# Patient Record
Sex: FEMALE | Race: ASIAN | Employment: FULL TIME | URBAN - METROPOLITAN AREA
[De-identification: names, ages, dates, MRNs, and addresses within clinical notes are randomized per-mention and may not be internally consistent; named-entity substitution may affect disease eponyms.]

---

## 2017-01-11 ENCOUNTER — COMMUNICATION - HEALTHEAST (OUTPATIENT)
Dept: ENDOCRINOLOGY | Facility: CLINIC | Age: 42
End: 2017-01-11

## 2017-01-11 DIAGNOSIS — E11.9 DIABETES TYPE 2, CONTROLLED (H): ICD-10-CM

## 2017-01-20 ENCOUNTER — COMMUNICATION - HEALTHEAST (OUTPATIENT)
Dept: OTOLARYNGOLOGY | Facility: CLINIC | Age: 42
End: 2017-01-20

## 2017-01-20 ENCOUNTER — COMMUNICATION - HEALTHEAST (OUTPATIENT)
Dept: ENDOCRINOLOGY | Facility: CLINIC | Age: 42
End: 2017-01-20

## 2017-01-20 DIAGNOSIS — E11.9 DIABETES TYPE 2, CONTROLLED (H): ICD-10-CM

## 2017-01-23 ENCOUNTER — COMMUNICATION - HEALTHEAST (OUTPATIENT)
Dept: OTOLARYNGOLOGY | Facility: CLINIC | Age: 42
End: 2017-01-23

## 2017-01-23 DIAGNOSIS — E11.9 DIABETES TYPE 2, CONTROLLED (H): ICD-10-CM

## 2017-03-16 ENCOUNTER — COMMUNICATION - HEALTHEAST (OUTPATIENT)
Dept: ENDOCRINOLOGY | Facility: CLINIC | Age: 42
End: 2017-03-16

## 2017-03-23 ENCOUNTER — AMBULATORY - HEALTHEAST (OUTPATIENT)
Dept: LAB | Facility: CLINIC | Age: 42
End: 2017-03-23

## 2017-03-23 DIAGNOSIS — E11.9 DIABETES TYPE 2, CONTROLLED (H): ICD-10-CM

## 2017-03-23 LAB
ALT SERPL W P-5'-P-CCNC: 18 U/L (ref 0–45)
CHOLEST SERPL-MCNC: 222 MG/DL
CREAT SERPL-MCNC: 0.76 MG/DL (ref 0.6–1.1)
FASTING STATUS PATIENT QL REPORTED: YES
GFR SERPL CREATININE-BSD FRML MDRD: >60 ML/MIN/1.73M2
HBA1C MFR BLD: 6.7 % (ref 3.5–6)
HDLC SERPL-MCNC: 54 MG/DL
LDLC SERPL CALC-MCNC: 141 MG/DL
TRIGL SERPL-MCNC: 137 MG/DL

## 2017-03-24 ENCOUNTER — OFFICE VISIT - HEALTHEAST (OUTPATIENT)
Dept: ENDOCRINOLOGY | Facility: CLINIC | Age: 42
End: 2017-03-24

## 2017-03-24 DIAGNOSIS — E11.9 DIABETES TYPE 2, CONTROLLED (H): ICD-10-CM

## 2017-03-25 ENCOUNTER — COMMUNICATION - HEALTHEAST (OUTPATIENT)
Dept: ENDOCRINOLOGY | Facility: CLINIC | Age: 42
End: 2017-03-25

## 2017-03-25 DIAGNOSIS — E11.9 DIABETES TYPE 2, CONTROLLED (H): ICD-10-CM

## 2017-04-19 ENCOUNTER — COMMUNICATION - HEALTHEAST (OUTPATIENT)
Dept: OTOLARYNGOLOGY | Facility: CLINIC | Age: 42
End: 2017-04-19

## 2017-04-19 DIAGNOSIS — E11.9 DIABETES TYPE 2, CONTROLLED (H): ICD-10-CM

## 2017-05-24 ENCOUNTER — COMMUNICATION - HEALTHEAST (OUTPATIENT)
Dept: ENDOCRINOLOGY | Facility: CLINIC | Age: 42
End: 2017-05-24

## 2017-05-24 DIAGNOSIS — E11.9 DIABETES TYPE 2, CONTROLLED (H): ICD-10-CM

## 2017-07-23 ENCOUNTER — COMMUNICATION - HEALTHEAST (OUTPATIENT)
Dept: ENDOCRINOLOGY | Facility: CLINIC | Age: 42
End: 2017-07-23

## 2017-07-23 DIAGNOSIS — E11.9 DIABETES TYPE 2, CONTROLLED (H): ICD-10-CM

## 2017-07-28 ENCOUNTER — COMMUNICATION - HEALTHEAST (OUTPATIENT)
Dept: ENDOCRINOLOGY | Facility: CLINIC | Age: 42
End: 2017-07-28

## 2017-07-28 DIAGNOSIS — E11.9 DIABETES TYPE 2, CONTROLLED (H): ICD-10-CM

## 2017-10-27 ENCOUNTER — COMMUNICATION - HEALTHEAST (OUTPATIENT)
Dept: ENDOCRINOLOGY | Facility: CLINIC | Age: 42
End: 2017-10-27

## 2017-10-27 DIAGNOSIS — E11.9 DIABETES TYPE 2, CONTROLLED (H): ICD-10-CM

## 2017-10-30 ENCOUNTER — COMMUNICATION - HEALTHEAST (OUTPATIENT)
Dept: ADMINISTRATIVE | Facility: CLINIC | Age: 42
End: 2017-10-30

## 2017-10-30 DIAGNOSIS — E11.9 DIABETES TYPE 2, CONTROLLED (H): ICD-10-CM

## 2017-10-30 DIAGNOSIS — E55.9 VITAMIN D DEFICIENCY: ICD-10-CM

## 2017-11-07 ENCOUNTER — OFFICE VISIT - HEALTHEAST (OUTPATIENT)
Dept: ENDOCRINOLOGY | Facility: CLINIC | Age: 42
End: 2017-11-07

## 2017-11-07 DIAGNOSIS — E55.9 VITAMIN D DEFICIENCY: ICD-10-CM

## 2017-11-07 DIAGNOSIS — E11.9 DIABETES TYPE 2, CONTROLLED (H): ICD-10-CM

## 2017-11-07 LAB
CREAT SERPL-MCNC: 0.63 MG/DL (ref 0.6–1.1)
GFR SERPL CREATININE-BSD FRML MDRD: >60 ML/MIN/1.73M2
HBA1C MFR BLD: 5.9 % (ref 3.5–6)

## 2017-11-07 ASSESSMENT — MIFFLIN-ST. JEOR: SCORE: 1360.5

## 2017-11-14 ENCOUNTER — COMMUNICATION - HEALTHEAST (OUTPATIENT)
Dept: ENDOCRINOLOGY | Facility: CLINIC | Age: 42
End: 2017-11-14

## 2017-11-15 ENCOUNTER — COMMUNICATION - HEALTHEAST (OUTPATIENT)
Dept: ENDOCRINOLOGY | Facility: CLINIC | Age: 42
End: 2017-11-15

## 2017-11-15 DIAGNOSIS — E11.9 DIABETES (H): ICD-10-CM

## 2017-11-21 ENCOUNTER — COMMUNICATION - HEALTHEAST (OUTPATIENT)
Dept: ENDOCRINOLOGY | Facility: CLINIC | Age: 42
End: 2017-11-21

## 2017-11-21 DIAGNOSIS — E11.9 DIABETES TYPE 2, CONTROLLED (H): ICD-10-CM

## 2017-11-24 ENCOUNTER — COMMUNICATION - HEALTHEAST (OUTPATIENT)
Dept: ENDOCRINOLOGY | Facility: CLINIC | Age: 42
End: 2017-11-24

## 2017-11-24 DIAGNOSIS — E11.9 DIABETES TYPE 2, CONTROLLED (H): ICD-10-CM

## 2018-01-19 ENCOUNTER — COMMUNICATION - HEALTHEAST (OUTPATIENT)
Dept: ENDOCRINOLOGY | Facility: CLINIC | Age: 43
End: 2018-01-19

## 2018-01-19 DIAGNOSIS — E11.9 DIABETES TYPE 2, CONTROLLED (H): ICD-10-CM

## 2018-01-25 ENCOUNTER — RECORDS - HEALTHEAST (OUTPATIENT)
Dept: LAB | Facility: CLINIC | Age: 43
End: 2018-01-25

## 2018-01-27 LAB — BACTERIA SPEC CULT: ABNORMAL

## 2018-03-03 ENCOUNTER — RECORDS - HEALTHEAST (OUTPATIENT)
Dept: LAB | Facility: CLINIC | Age: 43
End: 2018-03-03

## 2018-03-05 LAB — BACTERIA SPEC CULT: ABNORMAL

## 2018-04-17 ENCOUNTER — AMBULATORY - HEALTHEAST (OUTPATIENT)
Dept: ENDOCRINOLOGY | Facility: CLINIC | Age: 43
End: 2018-04-17

## 2018-04-17 DIAGNOSIS — E11.9 DIABETES TYPE 2, CONTROLLED (H): ICD-10-CM

## 2018-04-30 ENCOUNTER — AMBULATORY - HEALTHEAST (OUTPATIENT)
Dept: LAB | Facility: CLINIC | Age: 43
End: 2018-04-30

## 2018-04-30 DIAGNOSIS — E11.9 DIABETES TYPE 2, CONTROLLED (H): ICD-10-CM

## 2018-04-30 LAB
CHOLEST SERPL-MCNC: 210 MG/DL
CREAT SERPL-MCNC: 0.68 MG/DL (ref 0.6–1.1)
CREAT UR-MCNC: 36.9 MG/DL
FASTING STATUS PATIENT QL REPORTED: YES
GFR SERPL CREATININE-BSD FRML MDRD: >60 ML/MIN/1.73M2
HBA1C MFR BLD: 5.8 % (ref 3.5–6)
HDLC SERPL-MCNC: 59 MG/DL
LDLC SERPL CALC-MCNC: 127 MG/DL
MICROALBUMIN UR-MCNC: <0.5 MG/DL (ref 0–1.99)
MICROALBUMIN/CREAT UR: NORMAL MG/G
POTASSIUM BLD-SCNC: 4.4 MMOL/L (ref 3.5–5)
TRIGL SERPL-MCNC: 121 MG/DL

## 2018-05-07 ENCOUNTER — OFFICE VISIT - HEALTHEAST (OUTPATIENT)
Dept: ENDOCRINOLOGY | Facility: CLINIC | Age: 43
End: 2018-05-07

## 2018-05-07 DIAGNOSIS — E11.9 DIABETES TYPE 2, CONTROLLED (H): ICD-10-CM

## 2018-05-07 DIAGNOSIS — E55.9 VITAMIN D DEFICIENCY: ICD-10-CM

## 2018-05-07 ASSESSMENT — MIFFLIN-ST. JEOR: SCORE: 1327.38

## 2018-05-22 ENCOUNTER — COMMUNICATION - HEALTHEAST (OUTPATIENT)
Dept: ENDOCRINOLOGY | Facility: CLINIC | Age: 43
End: 2018-05-22

## 2018-05-22 DIAGNOSIS — E11.9 DIABETES TYPE 2, CONTROLLED (H): ICD-10-CM

## 2018-07-21 ENCOUNTER — COMMUNICATION - HEALTHEAST (OUTPATIENT)
Dept: ENDOCRINOLOGY | Facility: CLINIC | Age: 43
End: 2018-07-21

## 2018-07-21 DIAGNOSIS — E11.9 DIABETES TYPE 2, CONTROLLED (H): ICD-10-CM

## 2018-10-12 ENCOUNTER — AMBULATORY - HEALTHEAST (OUTPATIENT)
Dept: ENDOCRINOLOGY | Facility: CLINIC | Age: 43
End: 2018-10-12

## 2018-10-12 DIAGNOSIS — E11.9 DIABETES TYPE 2, CONTROLLED (H): ICD-10-CM

## 2018-11-01 ENCOUNTER — AMBULATORY - HEALTHEAST (OUTPATIENT)
Dept: LAB | Facility: CLINIC | Age: 43
End: 2018-11-01

## 2018-11-01 DIAGNOSIS — E11.9 DIABETES TYPE 2, CONTROLLED (H): ICD-10-CM

## 2018-11-01 LAB
CHOLEST SERPL-MCNC: 221 MG/DL
CREAT SERPL-MCNC: 0.75 MG/DL (ref 0.6–1.1)
FASTING STATUS PATIENT QL REPORTED: YES
GFR SERPL CREATININE-BSD FRML MDRD: >60 ML/MIN/1.73M2
HBA1C MFR BLD: 6.8 % (ref 3.5–6)
HDLC SERPL-MCNC: 74 MG/DL
LDLC SERPL CALC-MCNC: 124 MG/DL
POTASSIUM BLD-SCNC: 4.5 MMOL/L (ref 3.5–5)
TRIGL SERPL-MCNC: 115 MG/DL

## 2018-11-03 ENCOUNTER — COMMUNICATION - HEALTHEAST (OUTPATIENT)
Dept: ENDOCRINOLOGY | Facility: CLINIC | Age: 43
End: 2018-11-03

## 2018-11-03 DIAGNOSIS — E11.9 DIABETES TYPE 2, CONTROLLED (H): ICD-10-CM

## 2018-11-08 ENCOUNTER — OFFICE VISIT - HEALTHEAST (OUTPATIENT)
Dept: ENDOCRINOLOGY | Facility: CLINIC | Age: 43
End: 2018-11-08

## 2018-11-08 ASSESSMENT — MIFFLIN-ST. JEOR: SCORE: 1355.05

## 2018-11-12 ENCOUNTER — COMMUNICATION - HEALTHEAST (OUTPATIENT)
Dept: ENDOCRINOLOGY | Facility: CLINIC | Age: 43
End: 2018-11-12

## 2018-11-12 DIAGNOSIS — E11.9 DIABETES TYPE 2, CONTROLLED (H): ICD-10-CM

## 2018-11-13 ENCOUNTER — COMMUNICATION - HEALTHEAST (OUTPATIENT)
Dept: ENDOCRINOLOGY | Facility: CLINIC | Age: 43
End: 2018-11-13

## 2018-11-13 DIAGNOSIS — E11.9 DIABETES TYPE 2, CONTROLLED (H): ICD-10-CM

## 2018-11-22 ENCOUNTER — COMMUNICATION - HEALTHEAST (OUTPATIENT)
Dept: ENDOCRINOLOGY | Facility: CLINIC | Age: 43
End: 2018-11-22

## 2018-11-22 DIAGNOSIS — E11.9 DIABETES TYPE 2, CONTROLLED (H): ICD-10-CM

## 2018-11-30 ENCOUNTER — COMMUNICATION - HEALTHEAST (OUTPATIENT)
Dept: ENDOCRINOLOGY | Facility: CLINIC | Age: 43
End: 2018-11-30

## 2018-11-30 DIAGNOSIS — E11.9 DIABETES TYPE 2, CONTROLLED (H): ICD-10-CM

## 2018-12-01 ENCOUNTER — COMMUNICATION - HEALTHEAST (OUTPATIENT)
Dept: ENDOCRINOLOGY | Facility: CLINIC | Age: 43
End: 2018-12-01

## 2019-01-14 ENCOUNTER — COMMUNICATION - HEALTHEAST (OUTPATIENT)
Dept: ENDOCRINOLOGY | Facility: CLINIC | Age: 44
End: 2019-01-14

## 2019-01-14 DIAGNOSIS — E11.9 DIABETES TYPE 2, CONTROLLED (H): ICD-10-CM

## 2019-02-20 ENCOUNTER — NURSE TRIAGE (OUTPATIENT)
Dept: NURSING | Facility: CLINIC | Age: 44
End: 2019-02-20

## 2019-02-21 ENCOUNTER — COMMUNICATION - HEALTHEAST (OUTPATIENT)
Dept: ADMINISTRATIVE | Facility: CLINIC | Age: 44
End: 2019-02-21

## 2019-02-21 DIAGNOSIS — E11.9 DIABETES TYPE 2, CONTROLLED (H): ICD-10-CM

## 2019-04-17 ENCOUNTER — AMBULATORY - HEALTHEAST (OUTPATIENT)
Dept: ENDOCRINOLOGY | Facility: CLINIC | Age: 44
End: 2019-04-17

## 2019-04-17 DIAGNOSIS — E11.42 CONTROLLED TYPE 2 DIABETES MELLITUS WITH DIABETIC POLYNEUROPATHY, WITH LONG-TERM CURRENT USE OF INSULIN (H): ICD-10-CM

## 2019-04-17 DIAGNOSIS — Z79.4 CONTROLLED TYPE 2 DIABETES MELLITUS WITH DIABETIC POLYNEUROPATHY, WITH LONG-TERM CURRENT USE OF INSULIN (H): ICD-10-CM

## 2019-06-11 ENCOUNTER — RECORDS - HEALTHEAST (OUTPATIENT)
Dept: LAB | Facility: CLINIC | Age: 44
End: 2019-06-11

## 2019-06-11 LAB
ANION GAP SERPL CALCULATED.3IONS-SCNC: 11 MMOL/L (ref 5–18)
BUN SERPL-MCNC: 13 MG/DL (ref 8–22)
CALCIUM SERPL-MCNC: 9.8 MG/DL (ref 8.5–10.5)
CHLORIDE BLD-SCNC: 103 MMOL/L (ref 98–107)
CHOLEST SERPL-MCNC: 217 MG/DL
CO2 SERPL-SCNC: 27 MMOL/L (ref 22–31)
CREAT SERPL-MCNC: 0.76 MG/DL (ref 0.6–1.1)
FASTING STATUS PATIENT QL REPORTED: ABNORMAL
GFR SERPL CREATININE-BSD FRML MDRD: >60 ML/MIN/1.73M2
GLUCOSE BLD-MCNC: 163 MG/DL (ref 70–125)
HDLC SERPL-MCNC: 70 MG/DL
LDLC SERPL CALC-MCNC: 121 MG/DL
POTASSIUM BLD-SCNC: 3.8 MMOL/L (ref 3.5–5)
SODIUM SERPL-SCNC: 141 MMOL/L (ref 136–145)
TRIGL SERPL-MCNC: 129 MG/DL

## 2019-10-24 ENCOUNTER — OFFICE VISIT (OUTPATIENT)
Dept: PHARMACY | Facility: PHYSICIAN GROUP | Age: 44
End: 2019-10-24
Payer: COMMERCIAL

## 2019-10-24 ENCOUNTER — TRANSFERRED RECORDS (OUTPATIENT)
Dept: HEALTH INFORMATION MANAGEMENT | Facility: CLINIC | Age: 44
End: 2019-10-24

## 2019-10-24 DIAGNOSIS — E11.9 TYPE 2 DIABETES MELLITUS WITHOUT COMPLICATION, WITH LONG-TERM CURRENT USE OF INSULIN (H): Primary | ICD-10-CM

## 2019-10-24 DIAGNOSIS — F41.9 ANXIETY: ICD-10-CM

## 2019-10-24 DIAGNOSIS — E78.5 HYPERLIPIDEMIA LDL GOAL <100: ICD-10-CM

## 2019-10-24 DIAGNOSIS — Z79.4 TYPE 2 DIABETES MELLITUS WITHOUT COMPLICATION, WITH LONG-TERM CURRENT USE OF INSULIN (H): Primary | ICD-10-CM

## 2019-10-24 DIAGNOSIS — F32.9 MAJOR DEPRESSIVE DISORDER, REMISSION STATUS UNSPECIFIED, UNSPECIFIED WHETHER RECURRENT: ICD-10-CM

## 2019-10-24 LAB — HBA1C MFR BLD: 10.5 % (ref 4.2–6.1)

## 2019-10-24 PROCEDURE — 99607 MTMS BY PHARM ADDL 15 MIN: CPT | Performed by: PHARMACIST

## 2019-10-24 PROCEDURE — 99605 MTMS BY PHARM NP 15 MIN: CPT | Performed by: PHARMACIST

## 2019-10-24 RX ORDER — INSULIN LISPRO 100 [IU]/ML
INJECTION, SUSPENSION SUBCUTANEOUS
COMMUNITY
Start: 2019-02-21

## 2019-10-24 RX ORDER — ATORVASTATIN CALCIUM 80 MG/1
80 TABLET, FILM COATED ORAL DAILY
COMMUNITY

## 2019-10-24 RX ORDER — DULOXETIN HYDROCHLORIDE 60 MG/1
120 CAPSULE, DELAYED RELEASE ORAL DAILY
COMMUNITY
Start: 2015-02-24

## 2019-10-24 RX ORDER — HYDROXYZINE PAMOATE 50 MG/1
50 CAPSULE ORAL 4 TIMES DAILY PRN
COMMUNITY
Start: 2015-02-24

## 2019-10-24 ASSESSMENT — ANXIETY QUESTIONNAIRES
3. WORRYING TOO MUCH ABOUT DIFFERENT THINGS: MORE THAN HALF THE DAYS
7. FEELING AFRAID AS IF SOMETHING AWFUL MIGHT HAPPEN: MORE THAN HALF THE DAYS
5. BEING SO RESTLESS THAT IT IS HARD TO SIT STILL: MORE THAN HALF THE DAYS
2. NOT BEING ABLE TO STOP OR CONTROL WORRYING: MORE THAN HALF THE DAYS
IF YOU CHECKED OFF ANY PROBLEMS ON THIS QUESTIONNAIRE, HOW DIFFICULT HAVE THESE PROBLEMS MADE IT FOR YOU TO DO YOUR WORK, TAKE CARE OF THINGS AT HOME, OR GET ALONG WITH OTHER PEOPLE: SOMEWHAT DIFFICULT
GAD7 TOTAL SCORE: 14
6. BECOMING EASILY ANNOYED OR IRRITABLE: MORE THAN HALF THE DAYS
1. FEELING NERVOUS, ANXIOUS, OR ON EDGE: MORE THAN HALF THE DAYS

## 2019-10-24 ASSESSMENT — PATIENT HEALTH QUESTIONNAIRE - PHQ9: 5. POOR APPETITE OR OVEREATING: MORE THAN HALF THE DAYS

## 2019-10-24 ASSESSMENT — MIFFLIN-ST. JEOR: SCORE: 1420.96

## 2019-10-24 NOTE — PROGRESS NOTES
SUBJECTIVE/OBJECTIVE:                           Evangelista Watkins is a 44 year old female coming in for an initial visit for Medication Therapy Management.  She was referred to me from Anju Davalos.    Chief Complaint: Medication review.  Help with diabetes.      Allergies/ADRs: Reviewed in ECW  Tobacco: Quit 3-6 months ago  Alcohol: None currently  Caffeine: 4 cups/day of coffee  Activity: works out at gym 2-3 times per week  PMH: Reviewed in ECW    Medication Adherence/Access:  no issues reported  Barriers:  Costs of medications, has a $2000 deductible.     Diabetes:    Pt currently taking Humalog mix 75/25, 20 units in the morning and 10 units in the evening. Pt is not experiencing side effects.  Pt had been prescribed Victoza but didn't start yet because it was too expensive.  She would prefer the once weekly injection as well.  Discussed other medication options and wants to avoid meds that could cause weight gain and SGLT2 inhibitors due to risk for UTIs.    Hx of [employer] in [place], and had severe diarrhea at higher doses of metformin.  Unkown if she tried the ER formulation.  SMBG: doesn't test at home  Patient is not experiencing hypoglycemia  Recent symptoms of high blood sugar? none  Eye exam: Not discussed   Foot exam: Not discussed  ACEi/ARB: No.   Aspirin: Not taking, didn't discuss today.  Pt was distraught when I met with her, so we just discussed what she wanted to.  Pt was visibly upset and crying.    Diet/Exercise: Didn't discuss in detail.    GLYCOSYLATED HGB A1C - 10/24/2019      NAME VALUE REFERENCE RANGE LAB   F HGB A1C 10.5 H         GLYCOSYLATED HGB A1C - 06/11/2019      NAME VALUE REFERENCE RANGE LAB   F HGB A1C 9.3 H         Hyperlipidemia:   Current therapy includes Atorvastatin 80mg once daily.  Pt reports no significant myalgias or other side effects.      LIPID CASCADE - 06/11/2019      NAME VALUE REFERENCE RANGE    CHOLESTEROL 217 H <=199 (mg/dL)    TRIGLYCERIDES 129 <=149  "(mg/dL)    HDL CHOLESTEROL 70 >=50 (mg/dL)    LDL CHOLESTEROL CALCULATED 121 <=129 (mg/dL)     Depression/Anxiety:    Current medications include: Duloxetine 120mg once daily, and Hydroxyzine 50mg four times daily PRN (anxiety). Pt reports that depression symptoms are worsened. Has a lot of stressful things going on in life right now.  Didn't discuss in detail today with patient because she and Anju just discussed in their apt.  Pt was visibly upset and crying during our apt.     PHQ-9 SCORE 10/24/2019   PHQ-9 Total Score 14   GAD7 score: 14      BASIC METABOLIC PROFILE - 06/11/2019      NAME VALUE REFERENCE RANGE    SODIUM 141 136-145 (mmol/L)    POTASSIUM 3.8 3.5-5.0 (mmol/L)    CHLORIDE 103  (mmol/L)    CO2 27 22-31 (mmol/L)    ANION GAP, CALCULATION 11 5-18 (mmol/L)    GLUCOSE 163 H  (mg/dL)    CALCIUM 9.8 8.5-10.5 (mg/dL)    BLOOD UREA NITROGEN 13 8-22 (mg/dL)    CREATININE 0.76 0.60-1.10 (mg/dL)    GFR MDRD AF AMER >60 >60 (mL/min/1.73m2)    GFR MDRD NON AF AMER >60 >60 (mL/min/1.73m2)         Today's Vitals: /84   Pulse 100   Ht 5' 3.25\" (1.607 m)   Wt 177 lb (80.3 kg)   BMI 31.11 kg/m        ASSESSMENT:                              Medication Adherence: good, no issues identified      Diabetes:  Needs Improvement. Patient is not meeting A1c goal of < 7%.  Aspirin started at recent apt, indicated for patient over age 50.      Hyperlipidemia:   Stable. Pt is on high intensity statin which is indicated based on 2019 ACC/AHA guidelines for lipid management.        Depression/Anxiety:    Needs Improvement. Not fully discussed at today's apt, addressed by PCP.        PLAN:                            1)  Discussed plan for starting Ozempic today in clinic, Verbal OK given from Anju and pt given sample of Ozempic.        UPDATE 10/29:  Pt called - She had started Ozempic last thursday.  Had a bout of explosive diarrhea today at work and had to go home sick.  Denies nausea, fever/chills, " myalgias.  Appetite is lower now, and no more cravings for sweets.  No hypoglycemia.  Pt could use imodium PRN for diarrhea.  Will follow up with her on 10/31 about her symptoms to see how she is feeling.  If the diarrhea continues or worsens, would could try alternative medication (GLP1).    She reports that most of them are equally expensive but would be willing to pay the deductible if they work.  Other options:  continue insulin titration, re-trial on metformin ER, pioglitazone, or SGLT2 inhibitor    I spent 60 minutes with this patient today. I offer these suggestions for consideration by Anju Miller. A copy of the visit note was provided to the patient's primary care provider.    Will follow up in 1 week.    The patient declined a summary of these recommendations as an after visit summary.     Jelena Wallace, PharmD  Medication Therapy Management Pharmacist  Pager: [phone]

## 2019-10-29 VITALS
SYSTOLIC BLOOD PRESSURE: 118 MMHG | HEART RATE: 100 BPM | BODY MASS INDEX: 31.36 KG/M2 | HEIGHT: 63 IN | DIASTOLIC BLOOD PRESSURE: 84 MMHG | WEIGHT: 177 LBS

## 2019-10-29 ASSESSMENT — PATIENT HEALTH QUESTIONNAIRE - PHQ9: SUM OF ALL RESPONSES TO PHQ QUESTIONS 1-9: 14

## 2019-10-30 ASSESSMENT — ANXIETY QUESTIONNAIRES: GAD7 TOTAL SCORE: 14

## 2019-11-01 ENCOUNTER — RECORDS - HEALTHEAST (OUTPATIENT)
Dept: LAB | Facility: CLINIC | Age: 44
End: 2019-11-01

## 2019-11-03 LAB
BACTERIA SPEC CULT: ABNORMAL
BACTERIA SPEC CULT: ABNORMAL

## 2020-02-18 ENCOUNTER — RECORDS - HEALTHEAST (OUTPATIENT)
Dept: LAB | Facility: CLINIC | Age: 45
End: 2020-02-18

## 2020-02-21 LAB — BACTERIA SPEC CULT: NORMAL

## 2020-04-21 ENCOUNTER — TRANSFERRED RECORDS (OUTPATIENT)
Dept: HEALTH INFORMATION MANAGEMENT | Facility: CLINIC | Age: 45
End: 2020-04-21

## 2020-04-21 LAB — HBA1C MFR BLD: 10.1 % (ref 4.2–6.1)

## 2020-05-08 ENCOUNTER — ALLIED HEALTH/NURSE VISIT (OUTPATIENT)
Dept: PHARMACY | Facility: PHYSICIAN GROUP | Age: 45
End: 2020-05-08
Payer: COMMERCIAL

## 2020-05-08 DIAGNOSIS — F41.9 ANXIETY: ICD-10-CM

## 2020-05-08 DIAGNOSIS — Z79.4 TYPE 2 DIABETES MELLITUS WITHOUT COMPLICATION, WITH LONG-TERM CURRENT USE OF INSULIN (H): Primary | ICD-10-CM

## 2020-05-08 DIAGNOSIS — F32.9 MAJOR DEPRESSIVE DISORDER, REMISSION STATUS UNSPECIFIED, UNSPECIFIED WHETHER RECURRENT: ICD-10-CM

## 2020-05-08 DIAGNOSIS — E11.9 TYPE 2 DIABETES MELLITUS WITHOUT COMPLICATION, WITH LONG-TERM CURRENT USE OF INSULIN (H): Primary | ICD-10-CM

## 2020-05-08 DIAGNOSIS — F19.982 DRUG-INDUCED INSOMNIA (H): ICD-10-CM

## 2020-05-08 DIAGNOSIS — E78.5 HYPERLIPIDEMIA LDL GOAL <100: ICD-10-CM

## 2020-05-08 PROCEDURE — 99607 MTMS BY PHARM ADDL 15 MIN: CPT | Performed by: PHARMACIST

## 2020-05-08 PROCEDURE — 99605 MTMS BY PHARM NP 15 MIN: CPT | Performed by: PHARMACIST

## 2020-05-08 NOTE — PROGRESS NOTES
MTM ENCOUNTER  SUBJECTIVE/OBJECTIVE:                           Evangelista Watkins is a 45 year old female called for a follow-up visit. She was referred to me from Anju Joe.  Today's visit is a follow-up MTM visit from 10/42/2019.     Patient consented to a telehealth visit: yes  Telemedicine Visit Details  Type of service:  Telephone visit  Start Time: 1:30 pm  End Time: 2:04 PM  Originating Location (pt. Location): Home  Distant Location (provider location):  [facility] [place]  Mode of Communication:  Telephone    Chief Complaint: Follow up med review and diabetes, today is our comprehensive medication review 4/20/2020.  Patient would like to discuss increasing the Ozempic dose, and would needed.    Allergies/ADRs: Reviewed in ECW  Tobacco: Pt quit 3 months ago.    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    Medication Adherence/Access: no issues reported  Patient reports that the cost of her medications is a barrier, however she is done with her $2000 bill right now.  Ozempic is costing her $0.    Diabetes:    Pt currently taking Humalog mix 75/25, 20 units in the morning and 10 units in the evening, and Ozempic 0.5 mg once weekly. Pt is not experiencing side effects.  Patiently recently started taking the evening dose of her Humalog mix.  Since adding this evening dose back and she has been feeling a lot better.  Noticing less fatigue, decreased thirst, and not having to go to the bathroom as often.  Discussed other medication options and wants to avoid meds that could cause weight gain and SGLT2 inhibitors due to risk for UTIs.    Hx of [employer] in [email or web address] system, and had severe diarrhea at higher doses of metformin.  Unkown if she tried the ER formulation.  Did not discuss that  today's appointment  SMBG: doesn't test at home, we are currently in the process of getting her a freestyle suzy monitor.  This should be sent to her and delivered next Tuesday.  Patient is not experiencing hypoglycemia  Recent symptoms of high blood sugar? none  Eye exam: Not discussed   Foot exam: Not discussed  ACEi/ARB: No.   Aspirin: Currently taking aspirin 81 mg once daily.  Denies issues with bruising or bleeding.  Diet/Exercise:  Patient is very active and goes to the gym at least 4 days/week.  We did not discuss diet in detail today.      GLYCOSYLATED HGB A1C - 04/21/2020      NAME VALUE REFERENCE RANGE LAB   F HGB A1C 10.1 H 4.2-6.1 (%) 10       GLYCOSYLATED HGB A1C - 10/24/2019        NAME VALUE REFERENCE RANGE LAB   F HGB A1C 10.5 H             Depression/Anxiety/Insomnia:   Current medications include: Duloxetine 120 mg once daily and Bupropion  mg once daily.  Patient also has hydroxyzine 50 mg capsules 4 times daily as needed for anxiety, but tries to limit use as much as possible.  Patient reports that she has been feeling a lot better since starting the bupropion.  She has noticed a positive change in her mood.  Is feeling less depressed, less anxious and it is helping a lot with smoking cessation.  She is not noticing any issues with cravings for smoking tobacco products.  Patient denies issues with tremor or increased anxiety since starting bupropion.  She is noticing some issues with insomnia although this has gotten a little bit better since a switch from the 24-hour pill to the 12-hour extended release versions.  She is getting about 4 hours of sleep per night.  Having issues falling asleep and staying asleep.  Has not tried anything to help with insomnia.      PHQ-9 SCORE 10/24/2019   PHQ-9 Total Score 14     Hyperlipidemia:   Current therapy includes Atorvastatin 80mg once daily.  Pt reports no significant myalgias or other side effects.  ASCVD 10-year risk: 1.3%    LIPID CASCADE  -06/11/2019      NAME VALUE REFERENCE RANGE    CHOLESTEROL 217 H <=199 (mg/dL)    TRIGLYCERIDES 129 <=149 (mg/dL)    HDL CHOLESTEROL 70 >=50 (mg/dL)    LDL CHOLESTEROL CALCULATED 121 <=129 (mg/dL)       BASIC METABOLIC PROFILE -06/11/2019      NAME VALUE REFERENCE RANGE    SODIUM 141 136-145 (mmol/L)    POTASSIUM 3.8 3.5-5.0 (mmol/L)    CHLORIDE 103  (mmol/L)    CO2 27 22-31 (mmol/L)    ANION GAP, CALCULATION 11 5-18 (mmol/L)    GLUCOSE 163 H  (mg/dL)    CALCIUM 9.8 8.5-10.5 (mg/dL)    BLOOD UREA NITROGEN 13 8-22 (mg/dL)    CREATININE 0.76 0.60-1.10 (mg/dL)    GFR MDRD AF AMER >60 >60 (mL/min/1.73m2)    GFR MDRD NON AF AMER >60 >60 (mL/min/1.73m2)     Fasting Glucose reference range is 70-99 mg/dL per      American Diabetes Association (ADA) guidelines.      Last Vitals (4/21/2020):   Ht 63.25, Wt 176, BMI 30.93, Pulse 88, Resp 14, /88, Arm / Cuff size Large:left, Initials lmc/cma.    Today's Vitals: There were no vitals taken for this visit. -Telephone encounter      ASSESSMENT:                              Medication Adherence: good, no issues identified    Type 2 Diabetes:   Needs Improvement. Patient is not meeting A1c goal of < 7%. Pt would benefit from checking home blood sugars.  Patient is not currently checking her blood sugars, but there is a plan in place for her to start using a continuous blood sugar monitor.  Patient would also benefit from increasing her Ozempic dose to 1 mg.  She may require this with additional insulin dose adjustments, however we will hold off on doing this as we are increasing Ozempic first. Aspirin therapy is not indicated in this patient due to age below 50. Pt is taking with no problems, is likely safe for her to continue taking.  She will be 49 in 4 years, and then we would have her start it back again.    Depression/Anxiety/Insomnia:   Needs improvement, patient's mood has improved and is doing well on bupropion but is having insomnia from this medication.   Patient may benefit from trying melatonin at bedtime for sleep (3-6 mg) or could also use one hydroxyzine capsule for insomnia at bedtime.    Hyperlipidemia:   Stable. Pt is on high intensity statin which is indicated based on 2019 ACC/AHA guidelines for lipid management.        PLAN:                            1.   Increase Ozempic to 1 mg once weekly.  2.  Recommend trying Melatonin 3 mg at bedtime for insomnia.  If needed after 1 to 2 weeks could increase to 60 mg at bedtime.      Anju gave verbal permission to increase Ozempic dose.    I spent 60 minutes with this patient today. I offer these suggestions for consideration by Anju Davalos. A copy of the visit note was provided to the patient's primary care provider.    Will follow up in 1 week with Daphne Olmstead for help setting up the freestyle suzy monitor via video visit.  Patient is very excited to get her freestyle suzy monitor on a did not want to wait an additional week to schedule with me.  Help her schedule this appointment with Karie so she would not have to wait forgetting to use her suzy.  I will follow-up with patient at the end of the month on new Ozempic dose and home blood sugars.    The patient declined a summary of these recommendations.       Jelena Wallace, PharmD  Medication Therapy Management Pharmacist  Pager: [phone]

## 2020-05-10 RX ORDER — BUPROPION HYDROCHLORIDE 150 MG/1
150 TABLET, EXTENDED RELEASE ORAL DAILY
COMMUNITY

## 2020-05-10 RX ORDER — ASPIRIN 81 MG/1
81 TABLET ORAL DAILY
COMMUNITY

## 2020-05-13 ENCOUNTER — VIRTUAL VISIT (OUTPATIENT)
Dept: PHARMACY | Facility: PHYSICIAN GROUP | Age: 45
End: 2020-05-13
Payer: COMMERCIAL

## 2020-05-13 DIAGNOSIS — E11.9 TYPE 2 DIABETES MELLITUS WITHOUT COMPLICATION, WITH LONG-TERM CURRENT USE OF INSULIN (H): Primary | ICD-10-CM

## 2020-05-13 DIAGNOSIS — Z79.4 TYPE 2 DIABETES MELLITUS WITHOUT COMPLICATION, WITH LONG-TERM CURRENT USE OF INSULIN (H): Primary | ICD-10-CM

## 2020-05-13 PROCEDURE — 99606 MTMS BY PHARM EST 15 MIN: CPT | Mod: GT | Performed by: PHARMACIST

## 2020-05-13 PROCEDURE — 99607 MTMS BY PHARM ADDL 15 MIN: CPT | Mod: GT | Performed by: PHARMACIST

## 2020-05-13 NOTE — Clinical Note
Juventino Bowles- My video visit with Evangelista went really well. She had already applied the sensor but we talked through interpretation, setting targets, etc. I sent an Rx for test strips, she was getting an alert to check blood b/c her #'s are in 300's. I recommended she check maybe once a day when she gets that alert but until her BG comes down she doesn't need to with every scan. I did check Yvette and her numbers are now being shared. I apologize, I didn't realize she didn't have a follow-up with you scheduled yet so I didn't set one up. She is aware of the plan for you guys to follow-up around the end of the month though. Let me know if you have questions from my note.   Daphne

## 2020-05-13 NOTE — PROGRESS NOTES
MTM Encounter  SUBJECTIVE/OBJECTIVE:                           Evangelista Watkins is a 45 year old female contacted via secure video for a follow-up visit. She was referred to me from Anju Joe PA-C.  Today's visit is a follow-up MTM visit from 5/8/2020 with Jelena Wallace PharmD to provide education on starting Freestyle Norm CGM.    Patient consented to a telehealth visit: yes    Chief Complaint: Diabetes follow-up and Freestyle Norm education.    Allergies/ADRs: Reviewed in eCW  Tobacco:  reports that she quit smoking about 3 months ago. Her smoking use included cigarettes. She smoked 1.00 pack per day. She does not have any smokeless tobacco history on file.  Alcohol: not currently using  Caffeine: 4 cups/day of coffee  Activity: works out at gym 2-3 times per week  PMH: Reviewed in eCW, significant for T2DM,     Medication Adherence/Access: No issues reported.     Type 2 Diabetes:   Diabetes Medication(s)     Insulin       insulin lispro prot & lispro (HUMALOG MIX 75/25 KWIKPEN) (75-25) 100 UNIT/ML pen    20 units in the morning and 10 units in the evening    Incretin Mimetic Agents (GLP-1 Receptor Agonists)       semaglutide (OZEMPIC, 1 MG/DOSE,) 2 MG/1.5ML pen    Inject 1 mg Subcutaneous every 7 days        Patient's Ozempic dose was increased at her last visit.   SMBG: Continuous Glucose Monitor, Freestyle Norm. Patient received her Freestyle norm meter and sensor yesterday. She read the instructions and was able to apply the first sensor with help from her partner last night. She was able to connect the sensor to both the reader and her phone to scan and read her BG numbers. Denies any significant pain or issues with applying the sensor. She does note that she got numbers in the 300's last night so it alerted her to check with a fingerstick but she doesn't have any test strips. Today, AM BG was 195. She is willing to share CBG numbers with provider to review with Jelena at their next visit.   Symptoms  of low blood sugar? None, not experiencing low's   Symptoms of high blood sugar? fatigue  Eye exam: not discussed  Foot exam: not discussed  Diet/Exercise: patient has noticed feeling full faster with Ozempic and is hopeful this will help with weight loss.   Aspirin: Taking 81mg daily and denies side effects  Statin: Yes: Atorvastatin 80 mg daily   ACEi/ARB: No.     GLYCOSYLATED HGB A1C - 04/21/2020        NAME VALUE REFERENCE RANGE LAB   F HGB A1C 10.1 H 4.2-6.1 (%) 10         GLYCOSYLATED HGB A1C - 10/24/2019        NAME VALUE REFERENCE RANGE LAB   F HGB A1C 10.5 H            Today's Vitals: There were no vitals taken for this visit.- video visit    ASSESSMENT:                          Medication Adherence: good, no issues identified    1. Type 2 diabetes mellitus: Patient is not meeting A1c goal of < 7%. Self monitoring of blood glucose is not at goal of fasting  mg/dL and post prandial < 180 mg/dL. Patient would benefit from having test strips to use with Norm meter to verify BG when appropriate. Patient would benefit from ongoing PharmD follow-up to assist with interpreting CGM numbers and adjusting therapy as needed.   Education provided today on: Monitoring: purpose, log and interpret results, individual blood glucose targets and frequency of monitoring. CGM-specific education: Freestyle Norm sensor: insertion technique, sensor site location and rotation, sensor wear, reasons to remove sensor (MRI, CT, diathermy), Vitamin C & Aspirin effects on sensor, Norm [place]: frequency of scanning sensor, length of time data is visible, changing sensor, use of built in glucose meter, Precision X-tra test strips, Use of trends and graphs for pattern management and problem solving, [employer] software and sharing Norm data with providers.        Patient verbalized understanding of concepts discussed and recommendations provided today.      PLAN:                            1. Start using Norm CGM to check BG at  least once every 8 hours   2. Sent Rx for Precision test strips to use with Norm meter  3. Sent patient Lib[employer] request to share BG data with provider     PharmD Follow-up: By phone or video with Jelena in 4 weeks    I spent 30 minutes with this patient today. I offer these suggestions for consideration by Anju Joe PA-C. A copy of the visit note was provided to the patient's primary care provider.    The patient declined a summary of these recommendations.     Daphne Olmstead, PharmD  Medication Therapy Management Pharmacist  [facility]  Pager: [phone]      ----  Telemedicine Visit Details  Type of service:  Video visit  Start Time: 11:36 AM  End Time: 12:10 PM  Originating Location (pt. Location): Home  Distant Location (provider location):  [facility]  Mode of Communication:  Video Conference via [employer]

## 2020-05-17 RX ORDER — LANOLIN ALCOHOL/MO/W.PET/CERES
1 CREAM (GRAM) TOPICAL
COMMUNITY

## 2020-05-17 RX ORDER — FLASH GLUCOSE SENSOR
KIT MISCELLANEOUS
COMMUNITY

## 2020-05-17 RX ORDER — BLOOD SUGAR DIAGNOSTIC
STRIP MISCELLANEOUS
COMMUNITY

## 2020-05-17 RX ORDER — SEMAGLUTIDE 1.34 MG/ML
1 INJECTION, SOLUTION SUBCUTANEOUS
COMMUNITY

## 2020-06-15 ENCOUNTER — RECORDS - HEALTHEAST (OUTPATIENT)
Dept: LAB | Facility: CLINIC | Age: 45
End: 2020-06-15

## 2020-06-15 ENCOUNTER — TRANSFERRED RECORDS (OUTPATIENT)
Dept: HEALTH INFORMATION MANAGEMENT | Facility: CLINIC | Age: 45
End: 2020-06-15

## 2020-06-15 LAB
ANION GAP SERPL CALCULATED.3IONS-SCNC: 9 MMOL/L (ref 5–18)
BUN SERPL-MCNC: 12 MG/DL (ref 8–22)
CALCIUM SERPL-MCNC: 9.7 MG/DL (ref 8.5–10.5)
CHLORIDE BLD-SCNC: 103 MMOL/L (ref 98–107)
CHOLEST SERPL-MCNC: 134 MG/DL
CO2 SERPL-SCNC: 27 MMOL/L (ref 22–31)
CREAT SERPL-MCNC: 0.78 MG/DL (ref 0.6–1.1)
FASTING STATUS PATIENT QL REPORTED: NORMAL
GFR SERPL CREATININE-BSD FRML MDRD: >60 ML/MIN/1.73M2
GLUCOSE BLD-MCNC: 188 MG/DL (ref 70–125)
HBA1C MFR BLD: 9 % (ref 4.2–6.1)
HDLC SERPL-MCNC: 55 MG/DL
LDLC SERPL CALC-MCNC: 55 MG/DL
POTASSIUM BLD-SCNC: 3.8 MMOL/L (ref 3.5–5)
SODIUM SERPL-SCNC: 139 MMOL/L (ref 136–145)
TRIGL SERPL-MCNC: 119 MG/DL

## 2020-08-27 ENCOUNTER — TRANSFERRED RECORDS (OUTPATIENT)
Dept: HEALTH INFORMATION MANAGEMENT | Facility: CLINIC | Age: 45
End: 2020-08-27

## 2020-08-27 LAB — HBA1C MFR BLD: 8 % (ref 4.2–6.1)

## 2020-10-12 ENCOUNTER — TRANSFERRED RECORDS (OUTPATIENT)
Dept: HEALTH INFORMATION MANAGEMENT | Facility: CLINIC | Age: 45
End: 2020-10-12

## 2020-11-09 ENCOUNTER — TRANSFERRED RECORDS (OUTPATIENT)
Dept: HEALTH INFORMATION MANAGEMENT | Facility: CLINIC | Age: 45
End: 2020-11-09

## 2020-11-16 ENCOUNTER — TRANSFERRED RECORDS (OUTPATIENT)
Dept: HEALTH INFORMATION MANAGEMENT | Facility: CLINIC | Age: 45
End: 2020-11-16

## 2021-01-12 ENCOUNTER — TRANSFERRED RECORDS (OUTPATIENT)
Dept: HEALTH INFORMATION MANAGEMENT | Facility: CLINIC | Age: 46
End: 2021-01-12

## 2021-01-25 ENCOUNTER — IMMUNIZATION (OUTPATIENT)
Dept: NURSING | Facility: CLINIC | Age: 46
End: 2021-01-25
Payer: COMMERCIAL

## 2021-01-25 PROCEDURE — 91300 PR COVID VAC PFIZER DIL RECON 30 MCG/0.3 ML IM: CPT

## 2021-01-25 PROCEDURE — 0001A PR COVID VAC PFIZER DIL RECON 30 MCG/0.3 ML IM: CPT

## 2021-02-05 NOTE — TELEPHONE ENCOUNTER
FUTURE VISIT INFORMATION      FUTURE VISIT INFORMATION:    Date: 2/12/21    Time: 4 PM    [place] [facility]  REFERRAL INFORMATION:    Referring provider:  Dr. Danielson    Referring providers clinic:  [place]    Reason for visit/diagnosis: Sleep Apnea    RECORDS REQUESTED FROM:       Clinic name Comments Records Status Imaging Status   [facility]  Fax: [phone] 1/12/21, 12/23/20 - OV with Dr. Danielson   2/9 Sent to Scan    [facility] 1/12/21 - Dental Images    * No images for PACs, all within patient OV 2/9 Sent to Scan    [facility] 10/12/20 - Sleep Study 2/9 Sent to [facility] 11/9/20 - OV with Krystina Crane, SLP 2/9 Sent to Scan                        * 2/5/21 2:11 PM Faxed urgent req to [place] [facility] for records and sleep study. - Krystina  * 2/9/21 7:46 AM Records received from [facility] and sent to [facility] to be scanned into the chart. - Krystina

## 2021-02-09 ENCOUNTER — DOCUMENTATION ONLY (OUTPATIENT)
Dept: CARE COORDINATION | Facility: CLINIC | Age: 46
End: 2021-02-09

## 2021-02-12 ENCOUNTER — OFFICE VISIT (OUTPATIENT)
Dept: OTOLARYNGOLOGY | Facility: CLINIC | Age: 46
End: 2021-02-12
Payer: COMMERCIAL

## 2021-02-12 ENCOUNTER — PRE VISIT (OUTPATIENT)
Dept: OTOLARYNGOLOGY | Facility: CLINIC | Age: 46
End: 2021-02-12

## 2021-02-12 VITALS — HEART RATE: 121 BPM | WEIGHT: 173.94 LBS | TEMPERATURE: 97.4 F | OXYGEN SATURATION: 100 % | BODY MASS INDEX: 30.57 KG/M2

## 2021-02-12 DIAGNOSIS — G47.33 OSA (OBSTRUCTIVE SLEEP APNEA): Primary | ICD-10-CM

## 2021-02-12 PROCEDURE — 99202 OFFICE O/P NEW SF 15 MIN: CPT | Performed by: OTOLARYNGOLOGY

## 2021-02-12 RX ORDER — OXYMETAZOLINE HYDROCHLORIDE 0.05 G/100ML
2 SPRAY NASAL ONCE
Status: CANCELLED | OUTPATIENT
Start: 2021-02-12 | End: 2021-02-12

## 2021-02-12 RX ORDER — GLYCOPYRROLATE 0.2 MG/ML
0.2 INJECTION, SOLUTION INTRAMUSCULAR; INTRAVENOUS ONCE
Status: CANCELLED | OUTPATIENT
Start: 2021-02-12 | End: 2021-02-12

## 2021-02-12 ASSESSMENT — PAIN SCALES - GENERAL: PAINLEVEL: NO PAIN (0)

## 2021-02-12 NOTE — PROGRESS NOTES
SLEEP SURGERY CONSULTATION    Patient: Evangelista Watkins  : 1975  CHIEF COMPLAINT: NEELAM    IDENTIFICATION: Dr. Britney Danielson consulted Dr. Kidd for surgical evaluation and possible treatment of obstructive sleep apnea syndrome for Evangelista Watkins.    HPI:  Evangelista Watkins is a 45 year old year old female who has Obstructive Sleep Apnea.  Patient had a sleep study performed on 2020.  This was done at the [facility] [facility] [place].  Overall AHI was 36.  She is 65 obstructive events, 11 central events, 2 mixed apnea events.  Patient did try CPAP after her diagnosis.  She tried CPAP for several months.  Her biggest complaint about CPAP is that she still felt tired during the day.  Her biggest complaint about her obstructive sleep apnea is that she has excessive daytime sleepiness and unrefreshing sleep.  CPAP did not seem to make a significant difference in this.  Also she was having a hard time keeping it on her face and tolerating it overall.  After the CPAP trial she did tried a mandibular advancement device.  She felt like it did actually improve her airway and she could breathe a little easier.  However it was causing her a lot of jaw discomfort and pain and so she eventually did have to abandon therapy.  She has been seen by an oral maxillofacial surgeon and has had radiographic work-up which shows that she does have a narrowed posterior airway space.  She reports that normal is 11 and she is currently at 3 mm.  It was recommended that she would likely benefit from a maxillomandibular advancement.  She has been having a difficult time getting this covered through insurance.  Her oral surgeon recommended an evaluation with an ENT to see if there is evidence that a maxillomandibular advancement might be helpful or if there is another soft tissue surgery that might help improve the patient's outcomes.        PAST MEDICAL HISTORY:  Past Medical History:   Diagnosis Date     Diabetes (H)        PAST SURGICAL  HISTORY:  No past surgical history on file.    MEDICATIONS:  Current Outpatient Medications   Medication Sig Dispense Refill     aspirin 81 MG EC tablet Take 81 mg by mouth daily       atorvastatin (LIPITOR) 80 MG tablet Take 80 mg by mouth daily       buPROPion (WELLBUTRIN SR) 150 MG 12 hr tablet Take 150 mg by mouth daily       DULoxetine (CYMBALTA) 60 MG capsule Take 120 mg by mouth daily       hydrOXYzine (VISTARIL) 50 MG capsule Take 50 mg by mouth 4 times daily as needed       insulin lispro prot & lispro (HUMALOG MIX 75/25 KWIKPEN) (75-25) 100 UNIT/ML pen 20 units in the morning and 10 units in the evening       semaglutide (OZEMPIC, 1 MG/DOSE,) 2 MG/1.5ML pen Inject 1 mg Subcutaneous every 7 days       blood glucose (FREESTYLE PRECISION LEONILA TEST) test strip Use to test blood sugar as directed by Freestyle Norm       Continuous Blood Gluc Sensor (FREESTYLE NORM 14 DAY SENSOR) MISC Change every 14 days.       melatonin 3 MG tablet Take 1 mg by mouth nightly as needed for sleep         ALLERGIES:  Allergies   Allergen Reactions     Metformin Diarrhea     Other reaction(s): diarrhea  diarrhea          SOCIAL HISTORY:  Social History     Socioeconomic History     Marital status:      Spouse name: Not on file     Number of children: Not on file     Years of education: Not on file     Highest education level: Not on file   Occupational History     Not on file   Social Needs     Financial resource strain: Not on file     Food insecurity     Worry: Not on file     Inability: Not on file     Transportation needs     Medical: Not on file     Non-medical: Not on file   Tobacco Use     Smoking status: Former Smoker     Packs/day: 1.00     Types: Cigarettes     Quit date: 2020     Years since quittin.0   Substance and Sexual Activity     Alcohol use: No     Comment: rarely     Drug use: No     Sexual activity: Yes     Partners: Male   Lifestyle     Physical activity     Days per week: Not on file      Minutes per session: Not on file     Stress: Not on file   Relationships     Social connections     Talks on phone: Not on file     Gets together: Not on file     Attends Denominational service: Not on file     Active member of club or organization: Not on file     Attends meetings of clubs or organizations: Not on file     Relationship status: Not on file     Intimate partner violence     Fear of current or ex partner: Not on file     Emotionally abused: Not on file     Physically abused: Not on file     Forced sexual activity: Not on file   Other Topics Concern     Parent/sibling w/ CABG, MI or angioplasty before 65F 55M? No   Social History Narrative     Not on file       FAMILY HISTORY:  Family History   Problem Relation Age of Onset     Depression Mother      Unknown/Adopted Maternal Grandmother      Unknown/Adopted Maternal Grandfather      Unknown/Adopted Paternal Grandmother      Unknown/Adopted Paternal Grandfather      Unknown/Adopted Brother      Depression Brother      Unknown/Adopted Sister      Depression Sister      Depression Daughter      Unknown/Adopted Other      Unknown/Adopted Other      Depression Other      Autism Spectrum Disorder Other        REVIEW OF SYSTEMS:  No flowsheet data found.      PHYSICAL EXAM  Pulse 121   Temp 97.4  F (36.3  C)   Wt 78.9 kg (173 lb 15.1 oz)   SpO2 100%   BMI 30.57 kg/m      Constitutional: healthy, alert and no distress    ASSESSMENT:  1.  Severe obstructive sleep apnea,  untreated   Incompletely treated sleep apnea elevates risk of death, cardiovascular disease, and motor vehicle crashes, and it creates deficits in daytime function and quality of life.  Surgical treatment can improve these clinically important outcomes; therefore surgical treatment is medically necessary if the patient is not using CPAP adequately.       PLAN:  I discussed with the patient that I typically recommend a drug-induced sleep endoscopy for patients who has obstructive sleep apnea and  are considering surgery as a management option after CPAP failure.  We can use a drug-induced sleep endoscopy to better identify areas of airway collapse as well as determining whether or not certain surgical procedures might be helpful.  I agree with Dr. Danielson and the patient that maxillomandibular management is a good surgery for treatment of severe obstructive sleep apnea.  It is a multilevel surgery and it does have a good success rate.  We would also use it during sleep endoscopy to determine if there are other procedures that might be helpful for the patient.  She does understand that soft tissue surgery for obstructive sleep apnea does not have as good of success rate as maxillomandibular advancement and typically would save these as a last resort.    I spent 20 minutes face-to-face with Evangelista Watkins during today's office visit, of which more than 50% was spent on counseling and coordination of care, which included discussion of pathophysiology of patient's obstructive sleep apnea, treatment options, risks and benefits of each option.

## 2021-02-12 NOTE — LETTER
2021       RE: Evangelista Watkins  [address]  [place] [place] [address]     Dear Colleague,    Thank you for referring your patient, Evangelista Watkins, to the [facility] Oral at [facility]. Please see a copy of my visit note below.        SLEEP SURGERY CONSULTATION    Patient: Evangelista Watkins  : 1975  CHIEF COMPLAINT: NEELAM    IDENTIFICATION: Dr. Britney Danielson consulted Dr. Kidd for surgical evaluation and possible treatment of obstructive sleep apnea syndrome for Evangelista Watkins.    HPI:  Evangelista Watkins is a 45 year old year old female who has Obstructive Sleep Apnea.  Patient had a sleep study performed on 2020.  This was done at the [place] [facility] [place].  Overall AHI was 36.  She is 65 obstructive events, 11 central events, 2 mixed apnea events.  Patient did try CPAP after her diagnosis.  She tried CPAP for several months.  Her biggest complaint about CPAP is that she still felt tired during the day.  Her biggest complaint about her obstructive sleep apnea is that she has excessive daytime sleepiness and unrefreshing sleep.  CPAP did not seem to make a significant difference in this.  Also she was having a hard time keeping it on her face and tolerating it overall.  After the CPAP trial she did tried a mandibular advancement device.  She felt like it did actually improve her airway and she could breathe a little easier.  However it was causing her a lot of jaw discomfort and pain and so she eventually did have to abandon therapy.  She has been seen by an oral maxillofacial surgeon and has had radiographic work-up which shows that she does have a narrowed posterior airway space.  She reports that normal is 11 and she is currently at 3 mm.  It was recommended that she would likely benefit from a maxillomandibular advancement.  She has been having a difficult time getting this covered through insurance.  Her oral  surgeon recommended an evaluation with an ENT to see if there is evidence that a maxillomandibular advancement might be helpful or if there is another soft tissue surgery that might help improve the patient's outcomes.        PAST MEDICAL HISTORY:  Past Medical History:   Diagnosis Date     Diabetes (H)        PAST SURGICAL HISTORY:  No past surgical history on file.    MEDICATIONS:  Current Outpatient Medications   Medication Sig Dispense Refill     aspirin 81 MG EC tablet Take 81 mg by mouth daily       atorvastatin (LIPITOR) 80 MG tablet Take 80 mg by mouth daily       buPROPion (WELLBUTRIN SR) 150 MG 12 hr tablet Take 150 mg by mouth daily       DULoxetine (CYMBALTA) 60 MG capsule Take 120 mg by mouth daily       hydrOXYzine (VISTARIL) 50 MG capsule Take 50 mg by mouth 4 times daily as needed       insulin lispro prot & lispro (HUMALOG MIX 75/25 KWIKPEN) (75-25) 100 UNIT/ML pen 20 units in the morning and 10 units in the evening       semaglutide (OZEMPIC, 1 MG/DOSE,) 2 MG/1.5ML pen Inject 1 mg Subcutaneous every 7 days       blood glucose (FREESTYLE PRECISION LEONILA TEST) test strip Use to test blood sugar as directed by Freestyle Norm       Continuous Blood Gluc Sensor (FREESTYLE NORM 14 DAY SENSOR) MISC Change every 14 days.       melatonin 3 MG tablet Take 1 mg by mouth nightly as needed for sleep         ALLERGIES:  Allergies   Allergen Reactions     Metformin Diarrhea     Other reaction(s): diarrhea  diarrhea          SOCIAL HISTORY:  Social History     Socioeconomic History     Marital status:      Spouse name: Not on file     Number of children: Not on file     Years of education: Not on file     Highest education level: Not on file   Occupational History     Not on file   Social Needs     Financial resource strain: Not on file     Food insecurity     Worry: Not on file     Inability: Not on file     Transportation needs     Medical: Not on file     Non-medical: Not on file   Tobacco Use      Smoking status: Former Smoker     Packs/day: 1.00     Types: Cigarettes     Quit date: 2020     Years since quittin.0   Substance and Sexual Activity     Alcohol use: No     Comment: rarely     Drug use: No     Sexual activity: Yes     Partners: Male   Lifestyle     Physical activity     Days per week: Not on file     Minutes per session: Not on file     Stress: Not on file   Relationships     Social connections     Talks on phone: Not on file     Gets together: Not on file     Attends Sikhism service: Not on file     Active member of club or organization: Not on file     Attends meetings of clubs or organizations: Not on file     Relationship status: Not on file     Intimate partner violence     Fear of current or ex partner: Not on file     Emotionally abused: Not on file     Physically abused: Not on file     Forced sexual activity: Not on file   Other Topics Concern     Parent/sibling w/ CABG, MI or angioplasty before 65F 55M? No   Social History Narrative     Not on file       FAMILY HISTORY:  Family History   Problem Relation Age of Onset     Depression Mother      Unknown/Adopted Maternal Grandmother      Unknown/Adopted Maternal Grandfather      Unknown/Adopted Paternal Grandmother      Unknown/Adopted Paternal Grandfather      Unknown/Adopted Brother      Depression Brother      Unknown/Adopted Sister      Depression Sister      Depression Daughter      Unknown/Adopted Other      Unknown/Adopted Other      Depression Other      Autism Spectrum Disorder Other        REVIEW OF SYSTEMS:  No flowsheet data found.      PHYSICAL EXAM  Pulse 121   Temp 97.4  F (36.3  C)   Wt 78.9 kg (173 lb 15.1 oz)   SpO2 100%   BMI 30.57 kg/m      Constitutional: healthy, alert and no distress    ASSESSMENT:  1.  Severe obstructive sleep apnea,  untreated   Incompletely treated sleep apnea elevates risk of death, cardiovascular disease, and motor vehicle crashes, and it creates deficits in daytime function and  quality of life.  Surgical treatment can improve these clinically important outcomes; therefore surgical treatment is medically necessary if the patient is not using CPAP adequately.       PLAN:  I discussed with the patient that I typically recommend a drug-induced sleep endoscopy for patients who has obstructive sleep apnea and are considering surgery as a management option after CPAP failure.  We can use a drug-induced sleep endoscopy to better identify areas of airway collapse as well as determining whether or not certain surgical procedures might be helpful.  I agree with Dr. Danielson and the patient that maxillomandibular management is a good surgery for treatment of severe obstructive sleep apnea.  It is a multilevel surgery and it does have a good success rate.  We would also use it during sleep endoscopy to determine if there are other procedures that might be helpful for the patient.  She does understand that soft tissue surgery for obstructive sleep apnea does not have as good of success rate as maxillomandibular advancement and typically would save these as a last resort.    I spent 20 minutes face-to-face with Evangelista Watkins during today's office visit, of which more than 50% was spent on counseling and coordination of care, which included discussion of pathophysiology of patient's obstructive sleep apnea, treatment options, risks and benefits of each option.     Again, thank you for allowing me to participate in the care of your patient.      Sincerely,    Merari Kidd MD

## 2021-02-12 NOTE — NURSING NOTE
Chief Complaint   Patient presents with     Consult     NEELAM         Pulse 121, temperature 97.4  F (36.3  C), weight 78.9 kg (173 lb 15.1 oz), SpO2 100 %.    Tamara Weller, EMT

## 2021-02-13 ENCOUNTER — HEALTH MAINTENANCE LETTER (OUTPATIENT)
Age: 46
End: 2021-02-13

## 2021-02-15 ENCOUNTER — IMMUNIZATION (OUTPATIENT)
Dept: NURSING | Facility: CLINIC | Age: 46
End: 2021-02-15
Attending: FAMILY MEDICINE
Payer: COMMERCIAL

## 2021-02-15 PROCEDURE — 0002A PR COVID VAC PFIZER DIL RECON 30 MCG/0.3 ML IM: CPT

## 2021-02-15 PROCEDURE — 91300 PR COVID VAC PFIZER DIL RECON 30 MCG/0.3 ML IM: CPT

## 2021-02-16 ENCOUNTER — TELEPHONE (OUTPATIENT)
Dept: OTOLARYNGOLOGY | Facility: CLINIC | Age: 46
End: 2021-02-16

## 2021-02-16 NOTE — TELEPHONE ENCOUNTER
Left message regarding scheduling surgery with Dr. Kdid. Call back number left on voicemail.       Pam Weller   Perioperative Coordinator  Department of Otolaryngology    Office: [phone]

## 2021-02-23 ENCOUNTER — PREP FOR PROCEDURE (OUTPATIENT)
Dept: OTOLARYNGOLOGY | Facility: CLINIC | Age: 46
End: 2021-02-23

## 2021-02-23 ENCOUNTER — TELEPHONE (OUTPATIENT)
Dept: OTOLARYNGOLOGY | Facility: CLINIC | Age: 46
End: 2021-02-23

## 2021-02-25 ENCOUNTER — TELEPHONE (OUTPATIENT)
Dept: OTOLARYNGOLOGY | Facility: CLINIC | Age: 46
End: 2021-02-25

## 2021-02-25 NOTE — TELEPHONE ENCOUNTER
Left message regarding scheduling surgery with Dr. Kidd. Call back number left on voicemail.       Pam Weller on 2/25/2021 at 3:02 PM

## 2021-03-02 ENCOUNTER — PREP FOR PROCEDURE (OUTPATIENT)
Dept: OTOLARYNGOLOGY | Facility: CLINIC | Age: 46
End: 2021-03-02

## 2021-03-02 DIAGNOSIS — G47.33 OSA (OBSTRUCTIVE SLEEP APNEA): Primary | ICD-10-CM

## 2021-03-09 ENCOUNTER — TRANSFERRED RECORDS (OUTPATIENT)
Dept: HEALTH INFORMATION MANAGEMENT | Facility: CLINIC | Age: 46
End: 2021-03-09

## 2021-03-09 DIAGNOSIS — Z11.59 ENCOUNTER FOR SCREENING FOR OTHER VIRAL DISEASES: ICD-10-CM

## 2021-03-09 PROBLEM — G47.33 OSA (OBSTRUCTIVE SLEEP APNEA): Status: ACTIVE | Noted: 2021-03-09

## 2021-03-09 LAB — HBA1C MFR BLD: 9.2 % (ref 4.2–6.1)

## 2021-03-09 NOTE — TELEPHONE ENCOUNTER
Called patient to schedule surgery    Surgeon: Dr. Kidd  Date of Surgery: 03/24/2021  Location of surgery: [facility]  Pre-Op H&P: PCP   Post-Op Appt Date: 1 week virtual visit    Imaging needed:  No  Discussed COVID-19 testing:  Yes  Pre-cert/Authorization completed:  No  Packet sent out: Already Received 03/09/21    Understands that pre-op RN will call 2-3 days prior to surgery for arrival time and instructions. Aware of approximate arrival time. No further questions or concerns.

## 2021-03-19 ENCOUNTER — TRANSFERRED RECORDS (OUTPATIENT)
Dept: HEALTH INFORMATION MANAGEMENT | Facility: CLINIC | Age: 46
End: 2021-03-19

## 2021-03-20 DIAGNOSIS — Z11.59 ENCOUNTER FOR SCREENING FOR OTHER VIRAL DISEASES: ICD-10-CM

## 2021-03-20 LAB
SARS-COV-2 RNA RESP QL NAA+PROBE: NORMAL
SPECIMEN SOURCE: NORMAL

## 2021-03-20 PROCEDURE — U0005 INFEC AGEN DETEC AMPLI PROBE: HCPCS | Performed by: OTOLARYNGOLOGY

## 2021-03-20 PROCEDURE — U0003 INFECTIOUS AGENT DETECTION BY NUCLEIC ACID (DNA OR RNA); SEVERE ACUTE RESPIRATORY SYNDROME CORONAVIRUS 2 (SARS-COV-2) (CORONAVIRUS DISEASE [COVID-19]), AMPLIFIED PROBE TECHNIQUE, MAKING USE OF HIGH THROUGHPUT TECHNOLOGIES AS DESCRIBED BY CMS-2020-01-R: HCPCS | Performed by: OTOLARYNGOLOGY

## 2021-03-21 LAB
LABORATORY COMMENT REPORT: NORMAL
SARS-COV-2 RNA RESP QL NAA+PROBE: NEGATIVE
SPECIMEN SOURCE: NORMAL

## 2021-03-23 ENCOUNTER — ANESTHESIA EVENT (OUTPATIENT)
Dept: SURGERY | Facility: AMBULATORY SURGERY CENTER | Age: 46
End: 2021-03-23

## 2021-03-24 ENCOUNTER — ANESTHESIA (OUTPATIENT)
Dept: SURGERY | Facility: AMBULATORY SURGERY CENTER | Age: 46
End: 2021-03-24

## 2021-03-24 ENCOUNTER — HOSPITAL ENCOUNTER (OUTPATIENT)
Facility: AMBULATORY SURGERY CENTER | Age: 46
Discharge: HOME | End: 2021-03-24
Attending: OTOLARYNGOLOGY | Admitting: OTOLARYNGOLOGY
Payer: COMMERCIAL

## 2021-03-24 VITALS
WEIGHT: 165 LBS | DIASTOLIC BLOOD PRESSURE: 78 MMHG | RESPIRATION RATE: 14 BRPM | BODY MASS INDEX: 29.23 KG/M2 | TEMPERATURE: 98 F | HEIGHT: 63 IN | HEART RATE: 104 BPM | SYSTOLIC BLOOD PRESSURE: 126 MMHG | OXYGEN SATURATION: 98 %

## 2021-03-24 DIAGNOSIS — G47.33 OSA (OBSTRUCTIVE SLEEP APNEA): ICD-10-CM

## 2021-03-24 LAB
GLUCOSE BLDC GLUCOMTR-MCNC: 224 MG/DL (ref 70–99)
HCG UR QL: NEGATIVE
INTERNAL QC OK POCT: YES

## 2021-03-24 PROCEDURE — 31575 DIAGNOSTIC LARYNGOSCOPY: CPT

## 2021-03-24 PROCEDURE — 81025 URINE PREGNANCY TEST: CPT | Performed by: PATHOLOGY

## 2021-03-24 RX ORDER — ALBUTEROL SULFATE 0.83 MG/ML
2.5 SOLUTION RESPIRATORY (INHALATION) EVERY 4 HOURS PRN
Status: DISCONTINUED | OUTPATIENT
Start: 2021-03-24 | End: 2021-03-25 | Stop reason: HOSPADM

## 2021-03-24 RX ORDER — OXYMETAZOLINE HYDROCHLORIDE 0.05 G/100ML
2 SPRAY NASAL ONCE
Status: COMPLETED | OUTPATIENT
Start: 2021-03-24 | End: 2021-03-24

## 2021-03-24 RX ORDER — NALOXONE HYDROCHLORIDE 0.4 MG/ML
0.2 INJECTION, SOLUTION INTRAMUSCULAR; INTRAVENOUS; SUBCUTANEOUS
Status: DISCONTINUED | OUTPATIENT
Start: 2021-03-24 | End: 2021-03-25 | Stop reason: HOSPADM

## 2021-03-24 RX ORDER — LIDOCAINE HYDROCHLORIDE 40 MG/ML
SOLUTION TOPICAL PRN
Status: DISCONTINUED | OUTPATIENT
Start: 2021-03-24 | End: 2021-03-24 | Stop reason: HOSPADM

## 2021-03-24 RX ORDER — SODIUM CHLORIDE, SODIUM LACTATE, POTASSIUM CHLORIDE, CALCIUM CHLORIDE 600; 310; 30; 20 MG/100ML; MG/100ML; MG/100ML; MG/100ML
INJECTION, SOLUTION INTRAVENOUS CONTINUOUS
Status: DISCONTINUED | OUTPATIENT
Start: 2021-03-24 | End: 2021-03-25 | Stop reason: HOSPADM

## 2021-03-24 RX ORDER — LIDOCAINE 40 MG/G
CREAM TOPICAL
Status: DISCONTINUED | OUTPATIENT
Start: 2021-03-24 | End: 2021-03-24 | Stop reason: HOSPADM

## 2021-03-24 RX ORDER — ONDANSETRON 2 MG/ML
4 INJECTION INTRAMUSCULAR; INTRAVENOUS EVERY 30 MIN PRN
Status: DISCONTINUED | OUTPATIENT
Start: 2021-03-24 | End: 2021-03-25 | Stop reason: HOSPADM

## 2021-03-24 RX ORDER — NALOXONE HYDROCHLORIDE 0.4 MG/ML
0.4 INJECTION, SOLUTION INTRAMUSCULAR; INTRAVENOUS; SUBCUTANEOUS
Status: DISCONTINUED | OUTPATIENT
Start: 2021-03-24 | End: 2021-03-25 | Stop reason: HOSPADM

## 2021-03-24 RX ORDER — SODIUM CHLORIDE, SODIUM LACTATE, POTASSIUM CHLORIDE, CALCIUM CHLORIDE 600; 310; 30; 20 MG/100ML; MG/100ML; MG/100ML; MG/100ML
INJECTION, SOLUTION INTRAVENOUS CONTINUOUS
Status: DISCONTINUED | OUTPATIENT
Start: 2021-03-24 | End: 2021-03-24 | Stop reason: HOSPADM

## 2021-03-24 RX ORDER — PROPOFOL 10 MG/ML
INJECTION, EMULSION INTRAVENOUS CONTINUOUS PRN
Status: DISCONTINUED | OUTPATIENT
Start: 2021-03-24 | End: 2021-03-24

## 2021-03-24 RX ORDER — ACETAMINOPHEN 325 MG/1
975 TABLET ORAL ONCE
Status: COMPLETED | OUTPATIENT
Start: 2021-03-24 | End: 2021-03-24

## 2021-03-24 RX ORDER — ONDANSETRON 4 MG/1
4 TABLET, ORALLY DISINTEGRATING ORAL EVERY 30 MIN PRN
Status: DISCONTINUED | OUTPATIENT
Start: 2021-03-24 | End: 2021-03-25 | Stop reason: HOSPADM

## 2021-03-24 RX ORDER — OXYMETAZOLINE HYDROCHLORIDE 0.05 G/100ML
SPRAY NASAL PRN
Status: DISCONTINUED | OUTPATIENT
Start: 2021-03-24 | End: 2021-03-24 | Stop reason: HOSPADM

## 2021-03-24 RX ORDER — MEPERIDINE HYDROCHLORIDE 25 MG/ML
12.5 INJECTION INTRAMUSCULAR; INTRAVENOUS; SUBCUTANEOUS
Status: DISCONTINUED | OUTPATIENT
Start: 2021-03-24 | End: 2021-03-25 | Stop reason: HOSPADM

## 2021-03-24 RX ORDER — PROPOFOL 10 MG/ML
INJECTION, EMULSION INTRAVENOUS PRN
Status: DISCONTINUED | OUTPATIENT
Start: 2021-03-24 | End: 2021-03-24

## 2021-03-24 RX ORDER — GLYCOPYRROLATE 0.2 MG/ML
0.2 INJECTION, SOLUTION INTRAMUSCULAR; INTRAVENOUS ONCE
Status: COMPLETED | OUTPATIENT
Start: 2021-03-24 | End: 2021-03-24

## 2021-03-24 RX ADMIN — PROPOFOL 50 MCG/KG/MIN: 10 INJECTION, EMULSION INTRAVENOUS at 10:40

## 2021-03-24 RX ADMIN — PROPOFOL 20 MG: 10 INJECTION, EMULSION INTRAVENOUS at 10:40

## 2021-03-24 RX ADMIN — GLYCOPYRROLATE 0.2 MG: 0.2 INJECTION, SOLUTION INTRAMUSCULAR; INTRAVENOUS at 09:35

## 2021-03-24 RX ADMIN — OXYMETAZOLINE HYDROCHLORIDE 2 SPRAY: 0.05 SPRAY NASAL at 09:33

## 2021-03-24 RX ADMIN — SODIUM CHLORIDE, SODIUM LACTATE, POTASSIUM CHLORIDE, CALCIUM CHLORIDE: 600; 310; 30; 20 INJECTION, SOLUTION INTRAVENOUS at 09:35

## 2021-03-24 RX ADMIN — ACETAMINOPHEN 975 MG: 325 TABLET ORAL at 09:35

## 2021-03-24 ASSESSMENT — MIFFLIN-ST. JEOR: SCORE: 1357.57

## 2021-03-24 NOTE — OR NURSING
Patient's pre-op blood sugar was 224 and hgb A1c per H&P was 9.2. reviewed with Dr. Ortez, no interventions ordered.     Anni Ríos RN

## 2021-03-24 NOTE — OP NOTE
Operative Note    PREOPERATIVE DIAGNOSIS:   obstructive sleep apnea.        POSTOPERATIVE DIAGNOSIS:   obstructive sleep apnea.        PROCEDURE:  Drug-induced sleep endoscopy.        SURGEON:  Merari Kidd MD        ANESTHESIA:  Monitored anesthesia care.        SPECIMENS REMOVED:  None.        COMPLICATIONS:  None.        INDICATIONS:  Patient is a 45 year old female with moderate obstructive sleep apnea, who has difficulty tolerating CPAP therapy.        FINDINGS:  V: 100% AP collapse           O: 80-90% slow lateral collapse            T : 25-50% AP slow collapse            E: none     Jaw thrust greatly improved both velum and oropharyngeal collapse. 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 This patient would likely benefit from maxillomandibular advancement.          DESCRIPTION OF PROCEDURE:  The patient was brought into the operating room, placed on the operating table in supine position.  A member of the Department of Anesthesia was present and supplied the monitored anesthesia care.  A timeout was taken to correctly identify the patient and the procedure.  Once the patient reached an appropriate level of sedation, an endoscope was introduced into the patient's right nostril.  The upper airway was observed.  Findings are as above.  The scope was removed.  The patient was handed back over to Anesthesia and transferred to the PACU in stable condition.        I, Merari Kidd MD, was present during the key portions of the procedure, and I was immediately available for the entire procedure between opening and closing.

## 2021-03-24 NOTE — ANESTHESIA CARE TRANSFER NOTE
Patient: Evangelista Watkins    Procedure(s):  Drug-Induced Sleep Endoscopy    Diagnosis: NEELAM (obstructive sleep apnea) [G47.33]  Diagnosis Additional Information: No value filed.    Anesthesia Type:   MAC     Note:    Oropharynx: oropharynx clear of all foreign objects and spontaneously breathing  Level of Consciousness: awake  Oxygen Supplementation: room air    Independent Airway: airway patency satisfactory and stable  Dentition: dentition unchanged  Vital Signs Stable: post-procedure vital signs reviewed and stable  Report to RN Given: handoff report given  Patient transferred to: Phase II    Handoff Report: Identifed the Patient, Identified the Reponsible Provider, Reviewed the pertinent medical history, Discussed the surgical course, Reviewed Intra-OP anesthesia mangement and issues during anesthesia, Set expectations for post-procedure period and Allowed opportunity for questions and acknowledgement of understanding      Vitals: (Last set prior to Anesthesia Care Transfer)  CRNA VITALS  3/24/2021 1026 - 3/24/2021 1057      3/24/2021             Pulse:  100    SpO2:  96 %        Electronically Signed By: OWEN Bloom CRNA  March 24, 2021  10:57 AM

## 2021-03-24 NOTE — DISCHARGE INSTRUCTIONS
[facility]  Home Care Following Anesthesia  For 24 hours after surgery:  1. Get plenty of rest.  A responsible adult must stay with you for at least 24 hours after you leave the surgery center.  2. Do not drive or use heavy equipment.  If you have weakness or tingling, don't drive or use heavy equipment until this feeling goes away.   3. Do not drink alcohol.   4. Avoid strenuous or risky activities.  Ask for help when climbing stairs.  5. You may feel lightheaded.  IF so, sit for a few minutes before standing.  Have someone help you get up.   6. If you have nausea (feel sick to your stomach): Drink only clear liquids such as apple juice, ginger ale, broth or 7-Up.  Rest may also help.  Be sure to drink enough fluids.  Move to a regular diet as you feel able.   7. You may have a slight fever.  Call the doctor if your fever is over 100 F (37.7 C) (taken under the tongue) or lasts longer than 24 hours.  8. You may have a dry mouth, a sore throat, muscle aches or trouble sleeping. These should go away after 24 hours.  9. Do not make important or legal decisions.               Tips for taking pain medications  To get the best pain relief possible, remember these points:    Take pain medications as directed, before pain becomes severe.    Pain medication can upset your stomach: taking it with food may help.    Constipation is a common side effect of pain medication. Drink plenty of  fluids.    Eat foods high in fiber. Take a stool softener if recommended by your doctor or pharmacist.    Do not drink alcohol, drive or operate machinery while taking pain medications.    Ask about other ways to control pain, such as with heat, ice or relaxation.    Tylenol/Acetaminophen Consumption  To help encourage the safe use of acetaminophen, the makers of TYLENOL  have lowered the maximum daily dose for single-ingredient Extra Strength TYLENOL  (acetaminophen) products sold in the [place] from 8  pills per day (4,000 mg) to 6 pills per day (3,000 mg). The dosing interval has also changed from 2 pills every 4-6 hours to 2 pills every 6 hours.    If you feel your pain relief is insufficient, you may take Tylenol/Acetaminophen in addition to your narcotic pain medication.     Be careful not to exceed 3,000 mg of Tylenol/Acetaminophen in a 24 hour period from all sources.    If you are taking extra strength Tylenol/acetaminophen (500 mg), the maximum dose is 6 tablets in 24 hours.    If you are taking regular strength acetaminophen (325 mg), the maximum dose is 9 tablets in 24 hours.    Call a doctor for any of the followin. Signs of infection (fever, growing tenderness at the surgery site, a large amount of drainage or bleeding, severe pain, foul-smelling drainage, redness, swelling).  2. It has been over 8 to 10 hours since surgery and you are still not able to urinate (pass water).  3. Headache for over 24 hours.  4. Numbness, tingling or weakness the day after surgery (if you had spinal anesthesia).  5. Signs of Covid-19 infection (temperature over 100 degrees, shortness of breath, cough, loss of taste/smell, generalized body aches, persistent headache, chills, sore throat, nausea/vomiting/diarrhea)  Your doctor is:  Dr. Merari Kidd, ENT Otolaryngology: [phone]                    Or dial [phone] and ask for the resident on call for:  ENT Otolaryngology  For emergency care, call the:  [place] [facility]:  [phone] (TTY for hearing impaired: [phone])

## 2021-03-24 NOTE — ANESTHESIA PREPROCEDURE EVALUATION
Anesthesia Pre-Procedure Evaluation    Patient: Evangelista Watkins   MRN: [id number] : 1975        Preoperative Diagnosis: NEELAM (obstructive sleep apnea) [G47.33]   Procedure : Procedure(s):  Drug-Induced Sleep Endoscopy     Past Medical History:   Diagnosis Date     Diabetes (H)       History reviewed. No pertinent surgical history.   Allergies   Allergen Reactions     Metformin Diarrhea     Other reaction(s): diarrhea  diarrhea         Social History     Tobacco Use     Smoking status: Former Smoker     Packs/day: 1.00     Types: Cigarettes     Quit date: 2020     Years since quittin.1   Substance Use Topics     Alcohol use: No     Comment: rarely      Wt Readings from Last 1 Encounters:   21 74.8 kg (165 lb)        Anesthesia Evaluation            ROS/MED HX  ENT/Pulmonary:     (+) sleep apnea,     Neurologic:  - neg neurologic ROS     Cardiovascular:  - neg cardiovascular ROS     METS/Exercise Tolerance:     Hematologic:  - neg hematologic  ROS     Musculoskeletal:  - neg musculoskeletal ROS     GI/Hepatic:  - neg GI/hepatic ROS     Renal/Genitourinary:  - neg Renal ROS     Endo:     (+) type II DM,     Psychiatric/Substance Use:  - neg psychiatric ROS     Infectious Disease:  - neg infectious disease ROS     Malignancy:  - neg malignancy ROS     Other:  - neg other ROS          Physical Exam    Airway  airway exam normal           Respiratory Devices and Support         Dental  no notable dental history         Cardiovascular   cardiovascular exam normal          Pulmonary   pulmonary exam normal                OUTSIDE LABS:  CBC: No results found for: WBC, HGB, HCT, PLT  BMP: No results found for: NA, POTASSIUM, CHLORIDE, CO2, BUN, CR, GLC  COAGS: No results found for: PTT, INR, FIBR  POC:   Lab Results   Component Value Date     (H) 2021    HCG Negative 2021     HEPATIC: No results found for: ALBUMIN, PROTTOTAL, ALT, AST, GGT, ALKPHOS, BILITOTAL, BILIDIRECT, MARIA ELENA  OTHER:    Lab Results   Component Value Date    A1C 8.0 (A) 08/27/2020       Anesthesia Plan    ASA Status:  2   NPO Status:  NPO Appropriate    Anesthesia Type: MAC.     - Reason for MAC: straight local not clinically adequate   Induction: Intravenous.   Maintenance: TIVA.        Consents    Anesthesia Plan(s) and associated risks, benefits, and realistic alternatives discussed. Questions answered and patient/representative(s) expressed understanding.     - Discussed with:  Patient      - Extended Intubation/Ventilatory Support Discussed: No.      - Patient is DNR/DNI Status: No    Use of blood products discussed: No .     Postoperative Care    Pain management: Multi-modal analgesia.   PONV prophylaxis: Ondansetron (or other 5HT-3), Background Propofol Infusion     Comments:                Mick Ortez MD

## 2021-03-24 NOTE — ANESTHESIA POSTPROCEDURE EVALUATION
Patient: Evangelista Watkins    Procedure(s):  Drug-Induced Sleep Endoscopy    Diagnosis:NEELAM (obstructive sleep apnea) [G47.33]  Diagnosis Additional Information: No value filed.    Anesthesia Type:  MAC    Note:  Disposition: Outpatient   Postop Pain Control: Uneventful            Sign Out: Well controlled pain   PONV: No   Neuro/Psych: Uneventful            Sign Out: Acceptable/Baseline neuro status   Airway/Respiratory: Uneventful            Sign Out: Acceptable/Baseline resp. status   CV/Hemodynamics: Uneventful            Sign Out: Acceptable CV status   Other NRE: NONE   DID A NON-ROUTINE EVENT OCCUR? No         Last vitals:  Vitals:    03/24/21 0905 03/24/21 1100 03/24/21 1115   BP: (!) 127/97 122/85 126/78   Pulse: 87 106 104   Resp: 18 16 14   Temp: 36  C (96.8  F) 36.2  C (97.2  F) 36.7  C (98  F)   SpO2: 97% 97% 98%       Last vitals prior to Anesthesia Care Transfer:  CRNA VITALS  3/24/2021 1026 - 3/24/2021 1126      3/24/2021             Pulse:  100    SpO2:  96 %          Electronically Signed By: Mick Ortez MD  March 24, 2021  12:09 PM

## 2021-03-30 ENCOUNTER — VIRTUAL VISIT (OUTPATIENT)
Dept: OTOLARYNGOLOGY | Facility: CLINIC | Age: 46
End: 2021-03-30
Payer: COMMERCIAL

## 2021-03-30 VITALS — HEIGHT: 63 IN | BODY MASS INDEX: 29.23 KG/M2 | WEIGHT: 165 LBS

## 2021-03-30 DIAGNOSIS — G47.33 OSA (OBSTRUCTIVE SLEEP APNEA): Primary | ICD-10-CM

## 2021-03-30 PROCEDURE — 99212 OFFICE O/P EST SF 10 MIN: CPT | Mod: TEL | Performed by: OTOLARYNGOLOGY

## 2021-03-30 ASSESSMENT — MIFFLIN-ST. JEOR: SCORE: 1357.57

## 2021-03-30 ASSESSMENT — PAIN SCALES - GENERAL: PAINLEVEL: NO PAIN (0)

## 2021-03-30 NOTE — LETTER
3/30/2021       RE: Evangelista Watkins  [address]  [facility] [address]     Dear Colleague,    Thank you for referring your patient, Evangelista Watkins, to the [facility] [place] at [facility]. Please see a copy of my visit note below.  Evangelista is a 45 year old who is being evaluated via a billable telephone visit.      What phone number would you like to be contacted at? [phone]  How would you like to obtain your AVS? MyChart  Phone call duration: 7 minutes    CC: s/p Drug-Induced Sleep Endoscopy on 3/24/21    HPI: sleep endoscopy showed that she had collapse of the level of the soft palate, oral pharyngeal lateral walls and tongue base.  She had anterior posterior collapse at the level of the soft palate and tongue base.  She had lateral collapse at the level of the oropharyngeal lateral walls.    A/P:  Patient has a history of moderate obstructive sleep apnea.  Based off of her sleep endoscopy she does appear to have multilevel or global level airway collapse.  She did respond well to a jaw thrust.  Because of the global level of airway collapse, maxillomandibular management surgery may represent her best option for control of her obstructive sleep apnea.  Other option would be to consider inspire/hypoglossal nerve stimulation therapy.  I discussed with the patient that she may not get as much soft palate improvement with inspire compared to an MMA.  I answered all her questions today.  I will forward my note onto her oral surgeon.  She may follow-up with us as needed.    I spent a total of 10 minutes total time towards today's visit.  Time spent included counseling the patient.  Time was also spent reviewing records/tests; independently reviewing results and communicating results to the patient; and documenting clinical information in the electronic health record.        Again, thank you for allowing me to participate in  the care of your patient.      Sincerely,    Merari Kidd MD

## 2021-03-30 NOTE — LETTER
3/30/2021       RE: Evangelista Watkins  [address]  [facility] [address]     Dear Colleague,    Thank you for referring your patient, Evangelista Watkins, to the [facility] NOSE AND THROAT CLINIC [place] at [facility]. Please see a copy of my visit note below.    Evangelista is a 45 year old who is being evaluated via a billable telephone visit.      What phone number would you like to be contacted at? [phone]  How would you like to obtain your AVS? MyChart  Phone call duration: 7 minutes    CC: s/p Drug-Induced Sleep Endoscopy on 3/24/21    HPI: sleep endoscopy showed that she had collapse of the level of the soft palate, oral pharyngeal lateral walls and tongue base.  She had anterior posterior collapse at the level of the soft palate and tongue base.  She had lateral collapse at the level of the oropharyngeal lateral walls.    A/P:  Patient has a history of moderate obstructive sleep apnea.  Based off of her sleep endoscopy she does appear to have multilevel or global level airway collapse.  She did respond well to a jaw thrust.  Because of the global level of airway collapse, maxillomandibular management surgery may represent her best option for control of her obstructive sleep apnea.  Other option would be to consider inspire/hypoglossal nerve stimulation therapy.  I discussed with the patient that she may not get as much soft palate improvement with inspire compared to an MMA.  I answered all her questions today.  I will forward my note onto her oral surgeon.  She may follow-up with us as needed.    I spent a total of 10 minutes total time towards today's visit.  Time spent included counseling the patient.  Time was also spent reviewing records/tests; independently reviewing results and communicating results to the patient; and documenting clinical information in the electronic health record.        Again, thank you for allowing me to participate in  the care of your patient.      Sincerely,    Merari Kidd MD

## 2021-03-30 NOTE — PROGRESS NOTES
Evangelista is a 45 year old who is being evaluated via a billable telephone visit.      What phone number would you like to be contacted at? [phone]  How would you like to obtain your AVS? Judi  Phone call duration: 7 minutes    CC: s/p Drug-Induced Sleep Endoscopy on 3/24/21    HPI: sleep endoscopy showed that she had collapse of the level of the soft palate, oral pharyngeal lateral walls and tongue base.  She had anterior posterior collapse at the level of the soft palate and tongue base.  She had lateral collapse at the level of the oropharyngeal lateral walls.    A/P:  Patient has a history of moderate obstructive sleep apnea.  Based off of her sleep endoscopy she does appear to have multilevel or global level airway collapse.  She did respond well to a jaw thrust.  Because of the global level of airway collapse, maxillomandibular management surgery may represent her best option for control of her obstructive sleep apnea.  Other option would be to consider inspire/hypoglossal nerve stimulation therapy.  I discussed with the patient that she may not get as much soft palate improvement with inspire compared to an MMA.  I answered all her questions today.  I will forward my note onto her oral surgeon.  She may follow-up with us as needed.    I spent a total of 10 minutes total time towards today's visit.  Time spent included counseling the patient.  Time was also spent reviewing records/tests; independently reviewing results and communicating results to the patient; and documenting clinical information in the electronic health record.

## 2021-05-30 ENCOUNTER — HEALTH MAINTENANCE LETTER (OUTPATIENT)
Age: 46
End: 2021-05-30

## 2021-05-31 VITALS — WEIGHT: 165 LBS | HEIGHT: 64 IN | BODY MASS INDEX: 28.17 KG/M2

## 2021-06-01 VITALS — BODY MASS INDEX: 26.92 KG/M2 | WEIGHT: 157.7 LBS | HEIGHT: 64 IN

## 2021-06-02 VITALS — BODY MASS INDEX: 27.96 KG/M2 | HEIGHT: 64 IN | WEIGHT: 163.8 LBS

## 2021-06-09 NOTE — PROGRESS NOTES
Progress Note    Reason for Visit:      Progress Note:    HPI:       This pleasant 41-year-old.  Patient is afebrile.  Because of type 2 diabetes.    Review of Systems:    Nervous System: No headache, dizziness, fainting or memory loss. No tingling sensation of hand or feet.  Ears: No hearing loss or ringing in the ears  Eyes: No blurring of vision, redness, itching or dryness.  Nose: No nosebleed or loss of smell  Mouth: No mouth sores or loss of taste  Throat: No hoarseness or difficulty swallowing  Neck: No enlarged thyroid or lymph nodes.  Heart: No chest pain, palpitation or irregular heartbeat. No swelling of hands or feet  Lungs: No shortness of breath, cough, night sweats, wheezing or hemoptysis.  Gastrointestinal: No nausea or vomiting, constipation or diarrhea.  No acid reflux, abdominal pain or blood in stools.  Kidney/Bladdr: No polyuria, polydipsia, nocturia or hematuria.  Genital/Sexual: No loss of libido  Skin: No rash, hair loss or hirsutism.  No abnormal striae  Muscles/Joints/Bones: No morning stiffness, muscle aches and pain or loss of height.    Current Medications:  Current Outpatient Prescriptions   Medication Sig     ARIPiprazole (ABILIFY) 10 MG tablet Take 10 mg by mouth daily. At morning time     atorvastatin (LIPITOR) 80 MG tablet Take 80 mg by mouth daily.     canagliflozin (INVOKANA) 100 mg Tab Take 1 tablet (100 mg total) by mouth every morning before breakfast.     cholecalciferol, vitamin D3, 400 unit/mL Drop drops Take 400 Units by mouth daily.     DULOXETINE HCL (CYMBALTA ORAL) Take 120 mg by mouth daily.      hydrOXYzine (VISTARIL) 50 MG capsule Take 50 mg by mouth 2 (two) times a day as needed for anxiety.      insulin aspart protamine-insulin aspart (NOVOLOG/HUMALOG 70-30 FLEXPEN) 100 unit/mL (70-30) pen Inject 20 Units under the skin every evening.     insulin aspart protamine-insulin aspart (NOVOLOG/HUMALOG 70-30 FLEXPEN) 100 unit/mL (70-30) pen Inject 30 Units under the skin  every morning.     liraglutide (VICTOZA) 0.6 mg/0.1 mL (18 mg/3 mL) PnIj injection Inject 1.8 mg under the skin daily.      TRAZODONE HCL (TRAZODONE ORAL) Take 200 mg by mouth bedtime.      VICTOZA 3-ISAI 0.6 mg/0.1 mL (18 mg/3 mL) PnIj injection INJECT 0.3 ML (1.8 MG TOTAL) UNDER THE SKIN DAILY. WITH OR WITHOUT FOOD.       Patients Active Problems:  Patient Active Problem List   Diagnosis     Diabetes type 2, controlled     Vitamin D deficiency       History:   reports that she has been smoking.  She has a 34.50 pack-year smoking history. She has never used smokeless tobacco. She reports that she drinks alcohol. She reports that she does not use illicit drugs.   reports that she has been smoking.  She has a 34.50 pack-year smoking history. She has never used smokeless tobacco. She reports that she drinks alcohol. She reports that she does not use illicit drugs.  History   Smoking Status     Current Every Day Smoker     Packs/day: 1.50     Years: 23.00   Smokeless Tobacco     Never Used     Comment: Follow up with your PCP for counseling       reports that she has been smoking.  She has a 34.50 pack-year smoking history. She has never used smokeless tobacco. She reports that she drinks alcohol. She reports that she does not use illicit drugs.  History   Sexual Activity     Sexual activity: Not on file     Past Medical History:   Diagnosis Date     Allergic rhinitis      Borderline personality disorder      Cervical dysplasia      Diabetes mellitus      Hyperlipidemia      Insomnia      PTSD (post-traumatic stress disorder)      Suicide gesture 2002     Family History   Problem Relation Age of Onset     Adopted: Yes     Stroke Mother      Cancer Mother      Stroke Father      Past Medical History:   Diagnosis Date     Allergic rhinitis      Borderline personality disorder      Cervical dysplasia      Diabetes mellitus      Hyperlipidemia      Insomnia      PTSD (post-traumatic stress disorder)      Suicide gesture  2002     Past Surgical History:   Procedure Laterality Date     CERVICAL BIOPSY  W/ LOOP ELECTRODE EXCISION      x2     AL LAP, RADICAL HYST W/ TUBE&OV, NODE BX Bilateral 2/22/2016    Procedure: TOTAL LAPAROSCOPIC HYSTERECTOMY ;  Surgeon: Phillip Gusman MD;  Location: [facility];  Service: Gynecology       Vitals   vitals were not taken for this visit.        Exam  General appearance: The patient looked well, not in acute distress.  Eyes: no evidence of thyroid eye disease.   Retinal exam: No evidence of diabetic retinopathy.  Mouth and Throat: Normal  Neck: No evidence of thyromegaly, enlarged lymph node or tenderness  Chest: Trachea is central. Chest is clear to auscultation and percussion. Breat sounds are normal.  Cardiovascular exam: JVP is not raised. Heart sounds are normal, no murmurs or rub  Peripheral pulses are palpable.   Abdomen: No masses or tenderness.    Back: No vertebral tenderness or kyphosis.  Extremities: No evidence of leg edema.   Skin: Normal to touch.  No abnormal striae  Neurologic exam:  Visual fields are intact by confrontation, grossly intact. No evidence of peripheral neuropathy.  Detailed foot exam normal.        Diagnosis:  No diagnosis found.    Orders:   No orders of the defined types were placed in this encounter.        Assessment and Plan:  Type 2 diabetes.    The patient has depression she has not been checking her blood sugar recently.  A1c 6.8-6.7.    She is currently taking invokana 100 mg once a day.  NovoLog 7030 20 units in the morning and 10 in the evening.    Victoza 1.8 mg once a day.  She could not tolerate higher doses of our invokana because of diarrhea.    I did advise her to check her blood sugar more regularly and continue on the same regimen.    She's taking Lipitor 80 mg LDL is 141.  She supposed to be on Lipitor regularly she has not been taking regularly I did advise her to take it regularly.    Creatinine is normal at 0.76 detailed foot exams  normal    Her vitamin D is low at 11 foot at 50,000 units once a week.    I did spend 25 minutes with the patient more than 50% was spent in counseling and managing her care.

## 2021-06-14 NOTE — PROGRESS NOTES
Progress Note    Reason for Visit:      Progress Note:    HPI:    This pleasant 42-year-old female patient is here for follow-up because of type 2 diabetes.    Component      Latest Ref Rng & Units 7/5/2016 3/23/2017   Triglycerides      <=149 mg/dL  137   Cholesterol      <=199 mg/dL  222 (H)   LDL Calculated      <=129 mg/dL  141 (H)   HDL Cholesterol      >=50 mg/dL  54   Patient Fasting > 8hrs?        Yes   Creatinine      0.60 - 1.10 mg/dL 1.06 0.76   GFR MDRD Af Amer      >60 mL/min/1.73m2 >60 >60   GFR MDRD Non Af Amer      >60 mL/min/1.73m2 57 (L) >60   Microalbumin, Random Urine      0.00 - 1.99 mg/dL  <0.50   Creatinine, Urine      mg/dL  48.0   Microalbumin/Creatinine Ratio Random Urine      <=19.9 mg/g     Vitamin D, Total (25-Hydroxy)      30.0 - 80.0 ng/mL 25.0 (L)    Potassium      3.5 - 5.0 mmol/L 4.2 4.4   Hemoglobin A1c      3.5 - 6.0 % 6.8 (H) 6.7 (H)   ALT      0 - 45 U/L 12 18   Calcium      8.5 - 10.5 mg/dL 9.3            Review of Systems:    Nervous System: No headache, dizziness, fainting or memory loss. No tingling sensation of hand or feet.  Ears: No hearing loss or ringing in the ears  Eyes: No blurring of vision, redness, itching or dryness.  Nose: No nosebleed or loss of smell  Mouth: No mouth sores or loss of taste  Throat: No hoarseness or difficulty swallowing  Neck: No enlarged thyroid or lymph nodes.  Heart: No chest pain, palpitation or irregular heartbeat. No swelling of hands or feet  Lungs: No shortness of breath, cough, night sweats, wheezing or hemoptysis.  Gastrointestinal: No nausea or vomiting, constipation or diarrhea.  No acid reflux, abdominal pain or blood in stools.  Kidney/Bladdr: No polyuria, polydipsia, nocturia or hematuria.  Genital/Sexual: No loss of libido  Skin: No rash, hair loss or hirsutism.  No abnormal striae  Muscles/Joints/Bones: No morning stiffness, muscle aches and pain or loss of height.    Current Medications:  Current Outpatient Prescriptions    Medication Sig     ARIPiprazole (ABILIFY) 10 MG tablet Take 10 mg by mouth daily. At morning time     atorvastatin (LIPITOR) 80 MG tablet Take 80 mg by mouth daily.     cholecalciferol, vitamin D3, 400 unit/mL Drop drops Take 400 Units by mouth daily.     DULOXETINE HCL (CYMBALTA ORAL) Take 120 mg by mouth daily.      ergocalciferol (ERGOCALCIFEROL) 50,000 unit capsule Take 1 capsule (50,000 Units total) by mouth once a week.     hydrOXYzine (VISTARIL) 50 MG capsule Take 50 mg by mouth 2 (two) times a day as needed for anxiety.      insulin aspart protamine-insulin aspart (NOVOLOG/HUMALOG 70-30 FLEXPEN) 100 unit/mL (70-30) pen Inject 20 Units under the skin every evening.     insulin aspart protamine-insulin aspart (NOVOLOG/HUMALOG 70-30 FLEXPEN) 100 unit/mL (70-30) pen Inject 30 Units under the skin every morning.     INVOKANA 100 mg Tab TAKE 1 TABLET (100 MG TOTAL) BY MOUTH EVERY MORNING BEFORE BREAKFAST.     liraglutide (VICTOZA) 0.6 mg/0.1 mL (18 mg/3 mL) PnIj injection Inject 1.8 mg under the skin daily.      TRAZODONE HCL (TRAZODONE ORAL) Take 200 mg by mouth bedtime.      VICTOZA 3-ISAI 0.6 mg/0.1 mL (18 mg/3 mL) PnIj injection INJECT 0.3 ML (1.8MG TOTAL) UNDER THE SKIN ONCE DAILY WITH OR WITHOUT FOOD       Patients Active Problems:  Patient Active Problem List   Diagnosis     Diabetes type 2, controlled     Vitamin D deficiency       History:   reports that she has been smoking.  She has a 34.50 pack-year smoking history. She has never used smokeless tobacco. She reports that she drinks alcohol. She reports that she does not use illicit drugs.   reports that she has been smoking.  She has a 34.50 pack-year smoking history. She has never used smokeless tobacco. She reports that she drinks alcohol. She reports that she does not use illicit drugs.  History   Smoking Status     Current Every Day Smoker     Packs/day: 1.50     Years: 23.00   Smokeless Tobacco     Never Used     Comment: Follow up with your PCP  for counseling       reports that she has been smoking.  She has a 34.50 pack-year smoking history. She has never used smokeless tobacco. She reports that she drinks alcohol. She reports that she does not use illicit drugs.  History   Sexual Activity     Sexual activity: Not on file     Past Medical History:   Diagnosis Date     Allergic rhinitis      Borderline personality disorder      Cervical dysplasia      Diabetes mellitus      Hyperlipidemia      Insomnia      PTSD (post-traumatic stress disorder)      Suicide gesture 2002     Family History   Problem Relation Age of Onset     Adopted: Yes     Stroke Mother      Cancer Mother      Stroke Father      Past Medical History:   Diagnosis Date     Allergic rhinitis      Borderline personality disorder      Cervical dysplasia      Diabetes mellitus      Hyperlipidemia      Insomnia      PTSD (post-traumatic stress disorder)      Suicide gesture 2002     Past Surgical History:   Procedure Laterality Date     CERVICAL BIOPSY  W/ LOOP ELECTRODE EXCISION      x2     NC LAP, RADICAL HYST W/ TUBE&OV, NODE BX Bilateral 2/22/2016    Procedure: TOTAL LAPAROSCOPIC HYSTERECTOMY ;  Surgeon: Phillip Gusman MD;  [place] [facility] [place];  Service: Gynecology       Vitals   vitals were not taken for this visit.        Exam  General appearance: The patient looked well, not in acute distress.  Eyes: no evidence of thyroid eye disease.   Retinal exam: No evidence of diabetic retinopathy.  Mouth and Throat: Normal  Neck: No evidence of thyromegaly, enlarged lymph node or tenderness  Chest: Trachea is central. Chest is clear to auscultation and percussion. Breat sounds are normal.  Cardiovascular exam: JVP is not raised. Heart sounds are normal, no murmurs or rub  Peripheral pulses are palpable.   Abdomen: No masses or tenderness.    Back: No vertebral tenderness or kyphosis.  Extremities: No evidence of leg edema.   Skin: Normal to touch.  No abnormal striae  Neurologic exam:   Visual fields are intact by confrontation, grossly intact. No evidence of peripheral neuropathy.  Detailed foot exam normal.        Diagnosis:  No diagnosis found.    Orders:   No orders of the defined types were placed in this encounter.        Assessment and Plan: Type 2 diabetes the patient is largely noncompliant she is currently on Humalog 7525 she takes 20 units in the morning and 10 with supper.    She also takes Invokana 100 mg a day Victoza 1.8 mg daily.    Last A1c 6.7 unfortunately she does not check her blood sugar and she has not had any labs done.    I explained to the patient that she needs to check her blood sugar at least once a day would do labs today.    We if the A1c is high then we have to increase the insulin by 2 units across the board.    Hyperlipidemia on Lipitor supposed to be on Lipitor 80 mg she stopped taking that I will check lipid profile next time.    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    She takes trazodone 100 mg daily she is supposed to be on Abilify she is not taking that.    Her blood pressure is 110/78 I would check urine for microalbuminuria.    Patient return to clinic in 6 months I did spend 40 minutes with the patient more than 50% was spent on counseling and managing her care.

## 2021-06-16 NOTE — TELEPHONE ENCOUNTER
Telephone Encounter by Tabatha Max at 1/14/2019  3:06 PM     Author: Tabatha Max Service: -- Author Type: --    Filed: 1/14/2019  3:17 PM Encounter Date: 1/14/2019 Status: Signed    : Tabatha Max       [facility] team  [phone]    PA has been initiated.

## 2021-06-16 NOTE — TELEPHONE ENCOUNTER
Telephone Encounter by Tabatha Max at 1/15/2019  1:38 PM     Author: Tabatha Max Service: -- Author Type: --    Filed: 1/15/2019  1:42 PM Encounter Date: 1/14/2019 Status: Signed    : Tabatha Max APPROVED:    Approval start date:01/15/2019  Approval end date:01/14/2021    Pharmacy has been notified of approval and will contact patient when medication is ready for pickup.

## 2021-06-17 NOTE — PROGRESS NOTES
Progress Note    Reason for Visit:  Chief Complaint     Diabetes Mellitus          Progress Note:    HPI:       This patient is here for follow-up because of type 2 diabetes.    Component      Latest Ref Rng & Units 11/7/2017 4/30/2018   Triglycerides      <=149 mg/dL  121   Cholesterol      <=199 mg/dL  210 (H)   LDL Calculated      <=129 mg/dL  127   HDL Cholesterol      >=50 mg/dL  59   Patient Fasting > 8hrs?        Yes   Creatinine      0.60 - 1.10 mg/dL 0.63 0.68   GFR MDRD Af Amer      >60 mL/min/1.73m2 >60 >60   GFR MDRD Non Af Amer      >60 mL/min/1.73m2 >60 >60   Microalbumin, Random Urine      0.00 - 1.99 mg/dL  <0.50   Creatinine, Urine      mg/dL  36.9   Microalbumin/Creatinine Ratio Random Urine      <=19.9 mg/g     Vitamin D, Total (25-Hydroxy)      30.0 - 80.0 ng/mL 20.8 (L)    Potassium      3.5 - 5.0 mmol/L 4.0 4.4   Hemoglobin A1c      3.5 - 6.0 % 5.9 5.8   ALT      0 - 45 U/L     Calcium      8.5 - 10.5 mg/dL 9.3    TSH      0.30 - 5.00 uIU/mL 1.31    Free T4      0.7 - 1.8 ng/dL 0.9          Review of Systems:    Nervous System: No headache, dizziness, fainting or memory loss. No tingling sensation of hand or feet.  Ears: No hearing loss or ringing in the ears  Eyes: No blurring of vision, redness, itching or dryness.  Nose: No nosebleed or loss of smell  Mouth: No mouth sores or loss of taste  Throat: No hoarseness or difficulty swallowing  Neck: No enlarged thyroid or lymph nodes.  Heart: No chest pain, palpitation or irregular heartbeat. No swelling of hands or feet  Lungs: No shortness of breath, cough, night sweats, wheezing or hemoptysis.  Gastrointestinal: No nausea or vomiting, constipation or diarrhea.  No acid reflux, abdominal pain or blood in stools.  Kidney/Bladdr: No polyuria, polydipsia, nocturia or hematuria.  Genital/Sexual: No loss of libido  Skin: No rash, hair loss or hirsutism.  No abnormal striae  Muscles/Joints/Bones: No morning stiffness, muscle aches and pain or loss of  "height.    Current Medications:  Current Outpatient Prescriptions   Medication Sig     DULOXETINE HCL (CYMBALTA ORAL) Take 120 mg by mouth daily.      HUMALOG MIX 75-25 KWIKPEN 100 unit/mL (75-25) pen INJECT 20 UNITS IN THE MORNING, AND 10 UNITS IN THE EVENING SUBCUTANEOUSLY. MAX 30DAYS SUP/INS     hydrOXYzine (VISTARIL) 50 MG capsule Take 50 mg by mouth 2 (two) times a day as needed for anxiety.      INVOKANA 100 mg Tab TAKE 1 TABLET (100 MG TOTAL) BY MOUTH EVERY MORNING BEFORE BREAKFAST.     liraglutide (VICTOZA) 0.6 mg/0.1 mL (18 mg/3 mL) PnIj injection Inject 1.8 mg under the skin daily.      pen needle, diabetic (BD ULTRA-FINE TIAGO PEN NEEDLES) 32 gauge x 5/32\" Ndle Use to inject insulin and victoza daily       Patients Active Problems:  Patient Active Problem List   Diagnosis     Diabetes type 2, controlled     Vitamin D deficiency       History:   reports that she has been smoking.  She has a 34.50 pack-year smoking history. She has never used smokeless tobacco. She reports that she drinks alcohol. She reports that she does not use illicit drugs.   reports that she has been smoking.  She has a 34.50 pack-year smoking history. She has never used smokeless tobacco. She reports that she drinks alcohol. She reports that she does not use illicit drugs.  History   Smoking Status     Current Every Day Smoker     Packs/day: 1.50     Years: 23.00   Smokeless Tobacco     Never Used     Comment: Follow up with your PCP for counseling       reports that she has been smoking.  She has a 34.50 pack-year smoking history. She has never used smokeless tobacco. She reports that she drinks alcohol. She reports that she does not use illicit drugs.  History   Sexual Activity     Sexual activity: Not on file     Past Medical History:   Diagnosis Date     Allergic rhinitis      Borderline personality disorder      Cervical dysplasia      Diabetes mellitus      Hyperlipidemia      Insomnia      PTSD (post-traumatic stress disorder)  " "    Suicide gesture 2002     Family History   Problem Relation Age of Onset     Adopted: Yes     Stroke Mother      Cancer Mother      Stroke Father      Past Medical History:   Diagnosis Date     Allergic rhinitis      Borderline personality disorder      Cervical dysplasia      Diabetes mellitus      Hyperlipidemia      Insomnia      PTSD (post-traumatic stress disorder)      Suicide gesture 2002     Past Surgical History:   Procedure Laterality Date     CERVICAL BIOPSY  W/ LOOP ELECTRODE EXCISION      x2     VT LAP, RADICAL HYST W/ TUBE&OV, NODE BX Bilateral 2/22/2016    Procedure: TOTAL LAPAROSCOPIC HYSTERECTOMY ;  Surgeon: Phillip Gusman MD;  Location: [facility] [place];  Service: Gynecology       Vitals   height is 5' 3.5\" (1.613 m) and weight is 157 lb 11.2 oz (71.5 kg). Her blood pressure is 122/70.         Exam  General appearance: The patient looked well, not in acute distress.  Eyes: no evidence of thyroid eye disease.   Retinal exam: No evidence of diabetic retinopathy.  Mouth and Throat: Normal  Neck: No evidence of thyromegaly, enlarged lymph node or tenderness  Chest: Trachea is central. Chest is clear to auscultation and percussion. Breat sounds are normal.  Cardiovascular exam: JVP is not raised. Heart sounds are normal, no murmurs or rub  Peripheral pulses are palpable.   Abdomen: No masses or tenderness.    Back: No vertebral tenderness or kyphosis.  Extremities: No evidence of leg edema.   Skin: Normal to touch.  No abnormal striae  Neurologic exam:  Visual fields are intact by confrontation, grossly intact. No evidence of peripheral neuropathy.  Detailed foot exam normal.        Diagnosis:  No diagnosis found.    Orders:   No orders of the defined types were placed in this encounter.        Assessment and Plan: Type 2 diabetes.  The patient takes Humalog 75/25 she takes 20 units in the morning and 10 with supper.    She also takes Invokana 100 mg daily.    She has no side effects from " it.    She takes Victoza 1.8 mg daily.    Unfortunately the patient does not check her blood sugar I did advise her to take it at least once a week.    Her A1c came down from 5.9-5.8 she had said that she has no hypoglycemia unawareness.    Hyperlipidemia she was on Lipitor 80 mg she stopped that over a year ago her total cholesterol is 121 triglyceride 210 HDL 59 LDL is 127 I did advise her to take Pravachol 10 mg once a day.    Vitamin D deficiency her vitamin D is 20.8 I did advise her to take 2000 units daily over-the-counter TSH 1.3 free T4 0.9.    Unfortunately she is a smoker.    Creatinine is normal at 0.68.    She has no microalbuminuria.    The patient was very emotional and the visit today because she broke up with her boyfriend.    Blood pressure 122/70.  Should return to clinic in 6 month.    I have reviewed and ordered clinical lab test    I have reviewed and ordered radiology tests.    I have reviewed and ordered her medication as required.    I have reviewed her test results and advised with the performing physician.    I have reviewed the patient's old records.    I have reviewed and summarized the patient old records.    I did spend 40 minutes with the patient more than 50% was spent on counseling and managing her care.
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
## 2021-06-23 NOTE — TELEPHONE ENCOUNTER
Prior Authorization Request  Who s requesting:  Pharmacy  Pharmacy Name and Location: [facility] pharmacy   Medication Name: Victoza   Insurance Plan: [employer]   Insurance Member ID Number:  [id number]  Informed patient that prior authorizations can take up to 10 business days for response:   No  Okay to leave a detailed message: No

## 2021-06-24 NOTE — TELEPHONE ENCOUNTER
Patient needs the Humalog mix, Humalog regular, Victoza and the Pravastin refilled before the end of the month because she loses her insurance.  She would like [facility] day [facility]

## 2021-08-05 ENCOUNTER — TELEPHONE (OUTPATIENT)
Dept: OTOLARYNGOLOGY | Facility: CLINIC | Age: 46
End: 2021-08-05

## 2021-08-05 NOTE — TELEPHONE ENCOUNTER
Health Call Center    Phone Message    May a detailed message be left on voicemail: yes     Reason for Call: Other: Pt last saw Dr. Kidd for a virtual visit in March 2021. Pt states her insurance will not cover the corrective jaw surgery. She is now interested to find out more about the Inspire device, if she's a candidate, etc. Please call Pt to discuss. Thank you.     Action Taken: Message routed to:  [facility] ENT    Travel Screening: Not Applicable

## 2021-08-06 NOTE — TELEPHONE ENCOUNTER
FUTURE VISIT INFORMATION      FUTURE VISIT INFORMATION:    Date: 9/7/2021    Time: 2PM    [place] [facility]  REFERRAL INFORMATION:    Referring provider:      Referring providers clinic:      Reason for visit/diagnosis  Inspire Device consult, referred by Dr. Britney Danielson at [facility] oral surgery, Sleep Study Done 10/12/20 [facility], AHI 36.1    RECORDS REQUESTED FROM:       Clinic name Comments Records Status Imaging Status   [facility]  Fax: [phone] 1/12/21, 12/23/20 - OV with Dr. Danielson    2/9 Sent to Scan     [facility] 1/12/21 - Dental Images     * No images for PACs, all within patient OV 2/9 Sent to Scan     [facility] 10/12/20 - Sleep Study 2/9 Sent to Scan     [facility] 11/9/20 - OV with Krystina Crane, SLP 2/9 Sent to Scan      [facility] [place] 3/30/2021 note from Dr Marti ALLEN

## 2021-08-30 NOTE — TELEPHONE ENCOUNTER
Health Call Center    Phone Message    May a detailed message be left on voicemail: yes     Reason for Call: Other: Pt had to cancel her upcoming Appt with Dr Kidd - alecias call back to rescheudle - flako Consult for Inspire device only - please call Pt to discuss if she can have that Appt with Dr Kidd now or not - per call to [facility] Dr Kidd's nurse said to send TE and she would call Pt back to discuss what Dr Kidd had said needs to happen prior to this visit - Thanks     Action Taken: Message routed to:  [facility] ENT    Travel Screening: Not Applicable

## 2021-08-30 NOTE — TELEPHONE ENCOUNTER
"Spoke with patient regarding an upcoming appointment with Dr. Kidd. Patient was scheduled as a new patient on September 9th, however patient was seen previously with provider in March 2021.    Patient requesting to see provider for follow up on the Inspire device. Recommendation by provider from patient's virtual visit 03/30/21 stated that patient should consider maxillomandibular management surgery as the best option for control of her sleep apnea due the multilevel airway collapse.    Patient is stating that her insurance denied her having the surgery so she wanted to look back into Inspire. Provider's note states that Inspire \"would be an option but that she may not get as much soft palate improvement as compared to MMA\".    Patient was advised that approval would be necessary by her insurance to proceed but that we could set up an appointment for her to discuss things further with Dr. Kidd.    Per patient request, patient scheduled 09/16/2021 at 9:30 am with a telephone visit. Patient advised to call if any further questions or concerns prior to her appointment.    Patient verbalizes understanding of plan and is in agreement.    CHAD MENCHACA LPN on 8/30/2021 at 1:52 PM    "

## 2021-09-07 ENCOUNTER — PRE VISIT (OUTPATIENT)
Dept: OTOLARYNGOLOGY | Facility: CLINIC | Age: 46
End: 2021-09-07

## 2021-09-16 ENCOUNTER — VIRTUAL VISIT (OUTPATIENT)
Dept: OTOLARYNGOLOGY | Facility: CLINIC | Age: 46
End: 2021-09-16
Payer: COMMERCIAL

## 2021-09-16 DIAGNOSIS — G47.33 OSA (OBSTRUCTIVE SLEEP APNEA): Primary | ICD-10-CM

## 2021-09-16 PROBLEM — E11.9 DIABETES MELLITUS, TYPE 2 (H): Status: ACTIVE | Noted: 2021-09-16

## 2021-09-16 PROCEDURE — 99212 OFFICE O/P EST SF 10 MIN: CPT | Mod: TEL | Performed by: OTOLARYNGOLOGY

## 2021-09-16 NOTE — PROGRESS NOTES
Evangelista is a 46 year old who is being evaluated via a billable telephone visit.      What phone number would you like to be contacted at? [phone]  How would you like to obtain your AVS? Mail a copy    Phone call duration: 5 minutes    CC: NEELAM    HPI:, Patient reports that insurance denied her her surgeons request for maxillomandibular advancement to treat her obstructive sleep apnea.  She would like to now be considered for inspire.  She did undergo drug-induced sleep endoscopy with me on March 24, 2021.  Which did show anterior posterior collapse at the level of the soft palate as well as mild collapse at the tongue base.  Jaw thrust appeared to greatly improve both the velum as well as oropharyngeal collapse.  Patient expresses a lot of frustration about the amount of time its been taking to try to get surgical treatment for her obstructive sleep apnea.    A/P:  Patient has a history of moderate obstructive sleep apnea with intolerance to positive airway pressure therapy.  She was initially interested in maxillomandibular advancement and was referred to me by an oral surgeon to try to help expedite insurance approval.  Ultimately it appears that her insurance will not approve a maxillomandibular advancement.  It does seem like she would be a reasonable candidate for hypoglossal nerve stimulation therapy by drug-induced sleep endoscopy.  However I discussed with her that I will not be at the [facility] for much longer.  Therefore I would refer her to another surgeon to resume the process for inspire.  I also discussed with the patient that surgery for obstructive sleep apnea typically does require lengthy insurance approvals.      I spent a total of 10 minutes total time towards today's visit.  Time spent included seeing and evaluating Evangelista Watkins during today's office visit, which included counseling the patient.  Time was also spent reviewing records/tests; and documenting clinical information in the  electronic health record.

## 2021-09-16 NOTE — LETTER
9/16/2021       RE: Evangelista Watkins  [address]  [facility] [place] [address]     Dear Colleague,    Thank you for referring your patient, Evangelista Watkins, to the [facility] [place] at [facility]. Please see a copy of my visit note below.    Evangelista is a 46 year old who is being evaluated via a billable telephone visit.      What phone number would you like to be contacted at? [phone]  How would you like to obtain your AVS? Mail a copy    Phone call duration: 5 minutes    CC: NEELAM    HPI:, Patient reports that insurance denied her her surgeons request for maxillomandibular advancement to treat her obstructive sleep apnea.  She would like to now be considered for inspire.  She did undergo drug-induced sleep endoscopy with me on March 24, 2021.  Which did show anterior posterior collapse at the level of the soft palate as well as mild collapse at the tongue base.  Jaw thrust appeared to greatly improve both the velum as well as oropharyngeal collapse.  Patient expresses a lot of frustration about the amount of time its been taking to try to get surgical treatment for her obstructive sleep apnea.    A/P:  Patient has a history of moderate obstructive sleep apnea with intolerance to positive airway pressure therapy.  She was initially interested in maxillomandibular advancement and was referred to me by an oral surgeon to try to help expedite insurance approval.  Ultimately it appears that her insurance will not approve a maxillomandibular advancement.  It does seem like she would be a reasonable candidate for hypoglossal nerve stimulation therapy by drug-induced sleep endoscopy.  However I discussed with her that I will not be at the [facility] for much longer.  Therefore I would refer her to another surgeon to resume the process for inspire.  I also discussed with the patient that surgery for obstructive sleep apnea typically does  require lengthy insurance approvals.      I spent a total of 10 minutes total time towards today's visit.  Time spent included seeing and evaluating Evangelista Watkins during today's office visit, which included counseling the patient.  Time was also spent reviewing records/tests; and documenting clinical information in the electronic health record.    AVS mailed to patient with information regarding follow up with Dr. Joe Ruggiero for the Inspire device after patient consulted with Dr. Merari Kidd initially and has completed both the sleep study and drug induced sleep endoscopy.    Patient was provided contact information with addresses and phone numbers for Dr. Ruggiero. Patient also provided clinic phone numbers for the [facility] should she have any further questions.    CHAD MENCHACA LPN on 9/16/2021 at 10:14 AM      Again, thank you for allowing me to participate in the care of your patient.      Sincerely,    Merari Kidd MD

## 2021-09-16 NOTE — PROGRESS NOTES
[facility] mailed to patient with information regarding follow up with Dr. Joe Ruggiero for the Inspire device after patient consulted with Dr. Merari Kidd initially and has completed both the sleep study and drug induced sleep endoscopy.    Patient was provided contact information with addresses and phone numbers for Dr. Ruggiero. Patient also provided [place] [facility] have any further questions.    CHAD MENCHACA LPN on 9/16/2021 at 10:14 AM

## 2021-09-16 NOTE — PATIENT INSTRUCTIONS
1. You had a telephone visit today with Dr. Kidd.    2.   Dr. Kidd's recommendation regarding progressing with the Inspire device is to schedule an appointment to see her partner Dr. Joe Ruggiero. His clinic locations along with telephone numbers are listed below. They will have access to your previous records regarding your previous sleep study, your previous Drug Induced Sleep Endoscopy as well as your visit notes and recommendations from Dr. Kidd.    If you have any questions or concerns after your appointment, please call the clinic.   Clinic phone [phone].     Thank you for allowing us to assist in your care.    Georgiana Hoover, REYNA  [phone]    Grisel Hernandez, RNCC  [phone]    [facility]  Department of Otolaryngology      Dr. Joe Ruggiero:  [address]  [place] [address]   Clinic:[phone]    [address] [place] [address]  Information:[phone]

## 2021-09-19 ENCOUNTER — HEALTH MAINTENANCE LETTER (OUTPATIENT)
Age: 46
End: 2021-09-19

## 2021-09-22 ENCOUNTER — TELEPHONE (OUTPATIENT)
Dept: OTOLARYNGOLOGY | Facility: CLINIC | Age: 46
End: 2021-09-22

## 2021-09-22 NOTE — TELEPHONE ENCOUNTER
Patient will not schedule with Meghan in [place]  because she can only do appointments  on a Wednesday or Friday. Meghan does not work in Sleep either of these days.

## 2021-09-22 NOTE — TELEPHONE ENCOUNTER
Left message explaining that she does not need to see Dr. Odom today for the Inspire device for sleep apnea. The two surgeons that do this are Dr. Christianson and Dr. Ruggiero. She should call and schedule with either provider.  A [employer] message was also sent.     Teagan Sahu MA, [facility] ......9/22/2021...8:22 AM

## 2021-10-06 ENCOUNTER — LAB REQUISITION (OUTPATIENT)
Dept: LAB | Facility: CLINIC | Age: 46
End: 2021-10-06

## 2021-10-06 ENCOUNTER — TELEPHONE (OUTPATIENT)
Dept: OTOLARYNGOLOGY | Facility: CLINIC | Age: 46
End: 2021-10-06

## 2021-10-06 DIAGNOSIS — E78.2 MIXED HYPERLIPIDEMIA: ICD-10-CM

## 2021-10-06 DIAGNOSIS — E11.9 TYPE 2 DIABETES MELLITUS WITHOUT COMPLICATIONS (H): ICD-10-CM

## 2021-10-06 LAB
ANION GAP SERPL CALCULATED.3IONS-SCNC: 10 MMOL/L (ref 5–18)
BUN SERPL-MCNC: 10 MG/DL (ref 8–22)
CALCIUM SERPL-MCNC: 9.6 MG/DL (ref 8.5–10.5)
CHLORIDE BLD-SCNC: 102 MMOL/L (ref 98–107)
CHOLEST SERPL-MCNC: 178 MG/DL
CO2 SERPL-SCNC: 27 MMOL/L (ref 22–31)
CREAT SERPL-MCNC: 0.75 MG/DL (ref 0.6–1.1)
GFR SERPL CREATININE-BSD FRML MDRD: >90 ML/MIN/1.73M2
GLUCOSE BLD-MCNC: 175 MG/DL (ref 70–125)
HDLC SERPL-MCNC: 56 MG/DL
LDLC SERPL CALC-MCNC: 86 MG/DL
POTASSIUM BLD-SCNC: 3.8 MMOL/L (ref 3.5–5)
SODIUM SERPL-SCNC: 139 MMOL/L (ref 136–145)
TRIGL SERPL-MCNC: 182 MG/DL

## 2021-10-06 PROCEDURE — 80048 BASIC METABOLIC PNL TOTAL CA: CPT | Performed by: PHYSICIAN ASSISTANT

## 2021-10-06 PROCEDURE — 80061 LIPID PANEL: CPT | Performed by: PHYSICIAN ASSISTANT

## 2021-10-06 NOTE — TELEPHONE ENCOUNTER
Response will be attached to duplicate [employer] message.    Gi HAMMOND   [facility]

## 2021-10-06 NOTE — TELEPHONE ENCOUNTER
Reason for Call:  Other appointment    Detailed comments: patient was only able to do Friday afternoons and scheduled for 11-5 with Dr Ruggiero for inspire. She is wondering if she will be able to do the procedure before the end of the year.     Phone Number Patient can be reached at: Home number on file [phone] (home)    Best Time: any    Can we leave a detailed message on this number? YES    Call taken on 10/6/2021 at 1:22 PM by Anni West

## 2021-10-06 NOTE — TELEPHONE ENCOUNTER
Pt has had the work up with another ENT and then given your name for the Inspire.  Forwarding to provider to determine potential time line.    Gi HAMMOND   [facility]

## 2021-10-12 ENCOUNTER — VIRTUAL VISIT (OUTPATIENT)
Dept: OTOLARYNGOLOGY | Facility: CLINIC | Age: 46
End: 2021-10-12
Payer: COMMERCIAL

## 2021-10-12 VITALS — HEIGHT: 63 IN | BODY MASS INDEX: 29.23 KG/M2 | WEIGHT: 165 LBS

## 2021-10-12 DIAGNOSIS — Z78.9 INTOLERANCE OF CONTINUOUS POSITIVE AIRWAY PRESSURE (CPAP) VENTILATION: ICD-10-CM

## 2021-10-12 DIAGNOSIS — G47.33 MODERATE OBSTRUCTIVE SLEEP APNEA: Primary | ICD-10-CM

## 2021-10-12 PROCEDURE — 99214 OFFICE O/P EST MOD 30 MIN: CPT | Mod: GT | Performed by: OTOLARYNGOLOGY

## 2021-10-12 ASSESSMENT — MIFFLIN-ST. JEOR: SCORE: 1352.57

## 2021-10-12 NOTE — LETTER
10/12/2021         RE: Evangelista Watkins  [address]  [facility] [facility] [address]        Dear Colleague,    Thank you for referring your patient, Evangelista Watkins, to the [facility]. Please see a copy of my visit note below.    Chief Complaint   Patient presents with     Consult     Efra Naidu is a 46 year old who is being evaluated via a billable video visit.      How would you like to obtain your AVS? MyChart  If the video visit is dropped, the invitation should be resent by: Text  Will anyone else be joining your video visit? No      The patient was seeing Dr. Kidd for consideration of possible maxillomandibular advancement.  Insurance did not approve her MMA but she was also found to be a candidate for the implantable hypoglossal nerve stimulator.    She underwent a full night in lab study on 10/12/2020 which showed an AHI of 36.1.  Supine AHI 60.8, nonsupine AHI 28.9.     She had a DISE on 3/24/2021, which showed 100% AP collapse.  The patient did recently receive approval for the inspire hypoglossal nerve stimulator.    The patient has tried and failed CPAP.  She has tried several masks.  Patient's current BMI is 29.23 kg/m .    Past Medical History  Patient Active Problem List   Diagnosis     NEELAM (obstructive sleep apnea)     Diabetes mellitus, type 2 (H)     Current Medications     Current Outpatient Medications:      aspirin 81 MG EC tablet, Take 81 mg by mouth daily, Disp: , Rfl:      atorvastatin (LIPITOR) 80 MG tablet, Take 80 mg by mouth daily, Disp: , Rfl:      blood glucose (FREESTYLE PRECISION LEONILA TEST) test strip, Use to test blood sugar as directed by Freestyle Norm, Disp: , Rfl:      buPROPion (WELLBUTRIN SR) 150 MG 12 hr tablet, Take 150 mg by mouth daily, Disp: , Rfl:      Continuous Blood Gluc Sensor (FREESTYLE NORM 14 DAY SENSOR) MIS, Change every 14 days., Disp: , Rfl:      DULoxetine (CYMBALTA) 60 MG capsule, Take 120 mg by mouth daily, Disp: , Rfl:       hydrOXYzine (VISTARIL) 50 MG capsule, Take 50 mg by mouth 4 times daily as needed, Disp: , Rfl:      insulin lispro prot & lispro (HUMALOG MIX 75/25 KWIKPEN) (75-25) 100 UNIT/ML pen, 20 units in the morning and 10 units in the evening, Disp: , Rfl:      melatonin 3 MG tablet, Take 1 mg by mouth nightly as needed for sleep, Disp: , Rfl:      semaglutide (OZEMPIC, 1 MG/DOSE,) 2 MG/1.5ML pen, Inject 1 mg Subcutaneous every 7 days, Disp: , Rfl:     Allergies  Allergies   Allergen Reactions     Metformin Diarrhea     Other reaction(s): diarrhea  diarrhea          Social History   Social History     Socioeconomic History     Marital status:      Spouse name: Not on file     Number of children: Not on file     Years of education: Not on file     Highest education level: Not on file   Occupational History     Not on file   Tobacco Use     Smoking status: Former Smoker     Packs/day: 1.00     Types: Cigarettes     Quit date: 2020     Years since quittin.6   Substance and Sexual Activity     Alcohol use: No     Comment: rarely     Drug use: No     Sexual activity: Yes     Partners: Male   Other Topics Concern     Parent/sibling w/ CABG, MI or angioplasty before 65F 55M? No   Social History Narrative     Not on file     Social Determinants of Health     Financial Resource Strain:      Difficulty of Paying Living Expenses:    Food Insecurity:      Worried About Running Out of Food in the Last Year:      Ran Out of Food in the Last Year:    Transportation Needs:      Lack of Transportation (Medical):      Lack of Transportation (Non-Medical):    Physical Activity:      Days of Exercise per Week:      Minutes of Exercise per Session:    Stress:      Feeling of Stress :    Social Connections:      Frequency of Communication with Friends and Family:      Frequency of Social Gatherings with Friends and Family:      Attends [employer] Services:      Active Member of Clubs or Organizations:      Attends Club or  "Organization Meetings:      Marital Status:    Intimate Partner Violence:      Fear of Current or Ex-Partner:      Emotionally Abused:      Physically Abused:      Sexually Abused:        Family History  Family History   Adopted: Yes   Problem Relation Age of Onset     Depression Mother      Unknown/Adopted Maternal Grandmother      Unknown/Adopted Maternal Grandfather      Unknown/Adopted Paternal Grandmother      Unknown/Adopted Paternal Grandfather      Unknown/Adopted Brother      Depression Brother      Unknown/Adopted Sister      Depression Sister      Depression Daughter      Unknown/Adopted Other      Unknown/Adopted Other      Depression Other      Autism Spectrum Disorder Other      Cerebrovascular Disease Mother      Cancer Mother      Cerebrovascular Disease Father        Review of Systems  As per HPI and PMHx, otherwise 10+ comprehensive system review is negative.    Physical Exam  Ht 1.6 m (5' 3\")   Wt 74.8 kg (165 lb)   BMI 29.23 kg/m    GENERAL: Patient is a pleasant, cooperative 46 year old female in no acute distress.  PSYCHIATRIC: Patient is alert and oriented.  Mood and affect appear normal.    Assessment and Plan     ICD-10-CM    1. Moderate obstructive sleep apnea  G47.33    2. Intolerance of continuous positive airway pressure (CPAP) ventilation  Z78.9    3. BMI 29.0-29.9,adult  Z68.29      It was my pleasure seeing Evangelista Watkins today in clinic.  The patient presents to clinic today with moderate obstructive sleep apnea intolerant to CPAP.  The patient's BMI is 29.23 kg/m .  They are interested in the hypoglossal nerve stimulator.  We discussed placement of the hypoglossal nerve stimulator including postoperative course, activation, need for battery change, limitation of the chest/abdomen/pelvis MRI, need for alteration of airport screening.      We discussed the risks, benefits, alternatives, options of placement of the hypoglossal nerve stimulator including, but not limited to: Risk of " bleeding, risk of infection, potential risk of device infection or malfunction, risk of hematoma, risk of seroma, risk of postoperative pain, incision numbness, appearance of postoperative scar, injury to the marginal mandibular branch of the facial nerve or hypoglossal nerve that may be temporary/permanent, partial or complete, risk of pneumothorax, need for generator change in the future, limits of airline screening, limits on MRI imaging, need for postoperative follow-up, sleep programming, sleep titration, potential need for additional procedures, risk of general anesthesia.  Patient voiced understanding and is willing to proceed.  We discussed the postoperative course and convalescence including 2 weeks of limited activity and lifting restriction.  We discussed waiting 4 weeks to activate the device after placement.    We will check to make sure insurance is aware of the surgeon change and potential hospital change.  Once we confirm approval, we will reach out to schedule surgery.    Video Start Time: 1:02 PM    Video-Visit Details    Type of service:  Video Visit    Video End Time:1:28 PM    Originating Location (pt. Location): Other work    Distant Location (provider location):  [facility]     Platform used for Video Visit: Femi Ruggiero MD  Department of Otolaryngology-Head and Neck Surgery  [facility]      Again, thank you for allowing me to participate in the care of your patient.        Sincerely,        Joe Ruggiero MD

## 2021-10-12 NOTE — PROGRESS NOTES
Chief Complaint   Patient presents with     Consult     Efra Naidu is a 46 year old who is being evaluated via a billable video visit.      How would you like to obtain your AVS? MyChart  If the video visit is dropped, the invitation should be resent by: Text  Will anyone else be joining your video visit? No      The patient was seeing Dr. Kidd for consideration of possible maxillomandibular advancement.  Insurance did not approve her MMA but she was also found to be a candidate for the implantable hypoglossal nerve stimulator.    She underwent a full night in lab study on 10/12/2020 which showed an AHI of 36.1.  Supine AHI 60.8, nonsupine AHI 28.9.     She had a DISE on 3/24/2021, which showed 100% AP collapse.  The patient did recently receive approval for the inspire hypoglossal nerve stimulator.    The patient has tried and failed CPAP.  She has tried several masks.  Patient's current BMI is 29.23 kg/m .    Past Medical History  Patient Active Problem List   Diagnosis     NEELAM (obstructive sleep apnea)     Diabetes mellitus, type 2 (H)     Current Medications     Current Outpatient Medications:      aspirin 81 MG EC tablet, Take 81 mg by mouth daily, Disp: , Rfl:      atorvastatin (LIPITOR) 80 MG tablet, Take 80 mg by mouth daily, Disp: , Rfl:      blood glucose (FREESTYLE PRECISION LEONILA TEST) test strip, Use to test blood sugar as directed by Freestyle Norm, Disp: , Rfl:      buPROPion (WELLBUTRIN SR) 150 MG 12 hr tablet, Take 150 mg by mouth daily, Disp: , Rfl:      Continuous Blood Gluc Sensor (FREESTYLE NORM 14 DAY SENSOR) [facility] Change every 14 days., Disp: , Rfl:      DULoxetine (CYMBALTA) 60 MG capsule, Take 120 mg by mouth daily, Disp: , Rfl:      hydrOXYzine (VISTARIL) 50 MG capsule, Take 50 mg by mouth 4 times daily as needed, Disp: , Rfl:      insulin lispro prot & lispro (HUMALOG MIX 75/25 KWIKPEN) (75-25) 100 UNIT/ML pen, 20 units in the morning and 10 units in the evening, Disp: , Rfl:       melatonin 3 MG tablet, Take 1 mg by mouth nightly as needed for sleep, Disp: , Rfl:      semaglutide (OZEMPIC, 1 MG/DOSE,) 2 MG/1.5ML pen, Inject 1 mg Subcutaneous every 7 days, Disp: , Rfl:     Allergies  Allergies   Allergen Reactions     Metformin Diarrhea     Other reaction(s): diarrhea  diarrhea          Social History   Social History     Socioeconomic History     Marital status:      Spouse name: Not on file     Number of children: Not on file     Years of education: Not on file     Highest education level: Not on file   Occupational History     Not on file   Tobacco Use     Smoking status: Former Smoker     Packs/day: 1.00     Types: Cigarettes     Quit date: 2020     Years since quittin.6   Substance and Sexual Activity     Alcohol use: No     Comment: rarely     Drug use: No     Sexual activity: Yes     Partners: Male   Other Topics Concern     Parent/sibling w/ CABG, MI or angioplasty before 65F 55M? No   Social History Narrative     Not on file     Social Determinants of Health     Financial Resource Strain:      Difficulty of Paying Living Expenses:    Food Insecurity:      Worried About Running Out of Food in the Last Year:      Ran Out of Food in the Last Year:    Transportation Needs:      Lack of Transportation (Medical):      Lack of Transportation (Non-Medical):    Physical Activity:      Days of Exercise per Week:      Minutes of Exercise per Session:    Stress:      Feeling of Stress :    Social Connections:      Frequency of Communication with Friends and Family:      Frequency of Social Gatherings with Friends and Family:      Attends [employer] Services:      Active Member of Clubs or Organizations:      Attends Club or Organization Meetings:      Marital Status:    Intimate Partner Violence:      Fear of Current or Ex-Partner:      Emotionally Abused:      Physically Abused:      Sexually Abused:        Family History  Family History   Adopted: Yes   Problem Relation Age of  "Onset     Depression Mother      Unknown/Adopted Maternal Grandmother      Unknown/Adopted Maternal Grandfather      Unknown/Adopted Paternal Grandmother      Unknown/Adopted Paternal Grandfather      Unknown/Adopted Brother      Depression Brother      Unknown/Adopted Sister      Depression Sister      Depression Daughter      Unknown/Adopted Other      Unknown/Adopted Other      Depression Other      Autism Spectrum Disorder Other      Cerebrovascular Disease Mother      Cancer Mother      Cerebrovascular Disease Father        Review of Systems  As per HPI and PMHx, otherwise 10+ comprehensive system review is negative.    Physical Exam  Ht 1.6 m (5' 3\")   Wt 74.8 kg (165 lb)   BMI 29.23 kg/m    GENERAL: Patient is a pleasant, cooperative 46 year old female in no acute distress.  PSYCHIATRIC: Patient is alert and oriented.  Mood and affect appear normal.    Assessment and Plan     ICD-10-CM    1. Moderate obstructive sleep apnea  G47.33    2. Intolerance of continuous positive airway pressure (CPAP) ventilation  Z78.9    3. BMI 29.0-29.9,adult  Z68.29      It was my pleasure seeing Evangelista Watkins today in clinic.  The patient presents to clinic today with moderate obstructive sleep apnea intolerant to CPAP.  The patient's BMI is 29.23 kg/m .  They are interested in the hypoglossal nerve stimulator.  We discussed placement of the hypoglossal nerve stimulator including postoperative course, activation, need for battery change, limitation of the chest/abdomen/pelvis MRI, need for alteration of airport screening.      We discussed the risks, benefits, alternatives, options of placement of the hypoglossal nerve stimulator including, but not limited to: Risk of bleeding, risk of infection, potential risk of device infection or malfunction, risk of hematoma, risk of seroma, risk of postoperative pain, incision numbness, appearance of postoperative scar, injury to the marginal mandibular branch of the facial nerve or " hypoglossal nerve that may be temporary/permanent, partial or complete, risk of pneumothorax, need for generator change in the future, limits of airline screening, limits on MRI imaging, need for postoperative follow-up, sleep programming, sleep titration, potential need for additional procedures, risk of general anesthesia.  Patient voiced understanding and is willing to proceed.  We discussed the postoperative course and convalescence including 2 weeks of limited activity and lifting restriction.  We discussed waiting 4 weeks to activate the device after placement.    We will check to make sure insurance is aware of the surgeon change and potential hospital change.  Once we confirm approval, we will reach out to schedule surgery.    Video Start Time: 1:02 PM    Video-Visit Details    Type of service:  Video Visit    Video End Time:1:28 PM    Originating Location (pt. Location): Other work    Distant Location (provider location):  [facility]     Platform used for Video Visit: Femi Ruggiero MD  Department of Otolaryngology-Head and Neck Surgery  [facility]

## 2021-10-18 DIAGNOSIS — Z11.59 ENCOUNTER FOR SCREENING FOR OTHER VIRAL DISEASES: ICD-10-CM

## 2021-10-20 ENCOUNTER — TELEPHONE (OUTPATIENT)
Dept: SLEEP MEDICINE | Facility: CLINIC | Age: 46
End: 2021-10-20

## 2021-10-20 NOTE — TELEPHONE ENCOUNTER
Voicemail left requesting call back.  Patient needs to schedule an UAS activation appointment with either Dr Brewster or Dr. Salgado     Implant date 11/12/21, appointment to be scheduled 6 wks after implant.  Needs to be an in person appointment at the [place] location.

## 2021-11-10 ENCOUNTER — LAB REQUISITION (OUTPATIENT)
Dept: LAB | Facility: CLINIC | Age: 46
End: 2021-11-10
Payer: COMMERCIAL

## 2021-11-10 DIAGNOSIS — Z01.818 ENCOUNTER FOR OTHER PREPROCEDURAL EXAMINATION: ICD-10-CM

## 2021-11-10 LAB — SARS-COV-2 RNA RESP QL NAA+PROBE: NEGATIVE

## 2021-11-10 PROCEDURE — U0003 INFECTIOUS AGENT DETECTION BY NUCLEIC ACID (DNA OR RNA); SEVERE ACUTE RESPIRATORY SYNDROME CORONAVIRUS 2 (SARS-COV-2) (CORONAVIRUS DISEASE [COVID-19]), AMPLIFIED PROBE TECHNIQUE, MAKING USE OF HIGH THROUGHPUT TECHNOLOGIES AS DESCRIBED BY CMS-2020-01-R: HCPCS | Mod: ORL | Performed by: PHYSICIAN ASSISTANT

## 2021-11-11 ENCOUNTER — ANESTHESIA EVENT (OUTPATIENT)
Dept: SURGERY | Facility: CLINIC | Age: 46
End: 2021-11-11
Payer: COMMERCIAL

## 2021-11-11 ASSESSMENT — LIFESTYLE VARIABLES: TOBACCO_USE: 1

## 2021-11-11 NOTE — ANESTHESIA PREPROCEDURE EVALUATION
Anesthesia Pre-Procedure Evaluation    Patient: Evangelista Watkins   MRN: [id number] : 1975        Preoperative Diagnosis: Moderate obstructive sleep apnea [G47.33]  Intolerance of continuous positive airway pressure (CPAP) ventilation [Z78.9]  BMI 29.0-29.9,adult [Z68.29]    Procedure : Procedure(s):  INSERTION, PULSE GENERATOR, NEUROSTIMULATOR          Past Medical History:   Diagnosis Date     Diabetes (H)       Past Surgical History:   Procedure Laterality Date     BIOPSY CERVICAL, LOCAL EXCISION, SINGLE/MULTIPLE      x2     ENDOSCOPY DRUG INDUCED SLEEP N/A 3/24/2021    Procedure: Drug-Induced Sleep Endoscopy;  Surgeon: Merari Kidd MD;  Location: [facility] [place]     SD LAP, RADICAL HYST W/ TUBE&OV, NODE BX Bilateral 2016    Procedure: TOTAL LAPAROSCOPIC HYSTERECTOMY ;  Surgeon: Phillip Gusman MD;  Location: [facility];  Service: Gynecology      Allergies   Allergen Reactions     Metformin Diarrhea     Other reaction(s): diarrhea  diarrhea         Social History     Tobacco Use     Smoking status: Former Smoker     Packs/day: 1.00     Types: Cigarettes     Quit date: 2020     Years since quittin.7     Smokeless tobacco: Never Used   Substance Use Topics     Alcohol use: No     Comment: rarely      Wt Readings from Last 1 Encounters:   10/12/21 74.8 kg (165 lb)        Anesthesia Evaluation   Pt has had prior anesthetic. Type: General.        ROS/MED HX  ENT/Pulmonary:     (+) sleep apnea, tobacco use, Past use,     Neurologic:  - neg neurologic ROS     Cardiovascular:     (+) Dyslipidemia -----    METS/Exercise Tolerance:     Hematologic:  - neg hematologic  ROS     Musculoskeletal:  - neg musculoskeletal ROS     GI/Hepatic:  - neg GI/hepatic ROS     Renal/Genitourinary:  - neg Renal ROS     Endo:     (+) type II DM, Using insulin,     Psychiatric/Substance Use:     (+) psychiatric history anxiety and depression     Infectious Disease:  - neg infectious disease ROS     Malignancy:  -  neg malignancy ROS     Other:  - neg other ROS          Physical Exam    Airway        Mallampati: II   TM distance: > 3 FB   Neck ROM: full   Mouth opening: > 3 cm    Respiratory Devices and Support         Dental  no notable dental history         Cardiovascular   cardiovascular exam normal          Pulmonary   pulmonary exam normal                OUTSIDE LABS:  CBC: No results found for: WBC, HGB, HCT, PLT  BMP:   Lab Results   Component Value Date     10/06/2021     06/15/2020    POTASSIUM 3.8 10/06/2021    POTASSIUM 3.8 06/15/2020    CHLORIDE 102 10/06/2021    CHLORIDE 103 06/15/2020    CO2 27 10/06/2021    CO2 27 06/15/2020    BUN 10 10/06/2021    BUN 12 06/15/2020    CR 0.75 10/06/2021    CR 0.78 06/15/2020     (H) 10/06/2021     (H) 06/15/2020     COAGS: No results found for: PTT, INR, FIBR  POC:   Lab Results   Component Value Date     (H) 03/24/2021    HCG Negative 03/24/2021     HEPATIC:   Lab Results   Component Value Date    ALT 18 03/23/2017     OTHER:   Lab Results   Component Value Date    A1C 8.0 (A) 08/27/2020    NIRAV 9.6 10/06/2021       Anesthesia Plan    ASA Status:  2      Anesthesia Type: General.     - Airway: ETT   Induction: Propofol.   Maintenance: Balanced.        Consents    Anesthesia Plan(s) and associated risks, benefits, and realistic alternatives discussed. Questions answered and patient/representative(s) expressed understanding.     - Discussed with:  Patient         Postoperative Care    Pain management: IV analgesics, Oral pain medications, Multi-modal analgesia.   PONV prophylaxis: Ondansetron (or other 5HT-3), Dexamethasone or Solumedrol     Comments:         H&P reviewed: Unable to attach H&P to encounter due to EHR limitations. H&P Update: appropriate H&P reviewed, patient examined, interval changes since H&P (within 30 days) include:         OWEN De La Rosa CRNA

## 2021-11-12 ENCOUNTER — ANESTHESIA (OUTPATIENT)
Dept: SURGERY | Facility: CLINIC | Age: 46
End: 2021-11-12
Payer: COMMERCIAL

## 2021-11-12 ENCOUNTER — HOSPITAL ENCOUNTER (OUTPATIENT)
Facility: CLINIC | Age: 46
Discharge: HOME | End: 2021-11-12
Attending: OTOLARYNGOLOGY | Admitting: OTOLARYNGOLOGY
Payer: COMMERCIAL

## 2021-11-12 VITALS
SYSTOLIC BLOOD PRESSURE: 142 MMHG | DIASTOLIC BLOOD PRESSURE: 99 MMHG | HEIGHT: 63 IN | RESPIRATION RATE: 18 BRPM | TEMPERATURE: 98.8 F | BODY MASS INDEX: 29.23 KG/M2 | WEIGHT: 165 LBS | OXYGEN SATURATION: 97 % | HEART RATE: 96 BPM

## 2021-11-12 DIAGNOSIS — Z78.9 INTOLERANCE OF CONTINUOUS POSITIVE AIRWAY PRESSURE (CPAP) VENTILATION: ICD-10-CM

## 2021-11-12 DIAGNOSIS — G47.33 MODERATE OBSTRUCTIVE SLEEP APNEA: ICD-10-CM

## 2021-11-12 DIAGNOSIS — G47.33 OSA (OBSTRUCTIVE SLEEP APNEA): Primary | ICD-10-CM

## 2021-11-12 LAB
GLUCOSE BLDC GLUCOMTR-MCNC: 203 MG/DL (ref 70–99)
GLUCOSE BLDC GLUCOMTR-MCNC: 270 MG/DL (ref 70–99)

## 2021-11-12 PROCEDURE — C1767 GENERATOR, NEURO NON-RECHARG: HCPCS | Performed by: OTOLARYNGOLOGY

## 2021-11-12 PROCEDURE — 250N000025 HC SEVOFLURANE, PER MIN: Performed by: OTOLARYNGOLOGY

## 2021-11-12 PROCEDURE — 82962 GLUCOSE BLOOD TEST: CPT | Mod: 91

## 2021-11-12 PROCEDURE — 258N000003 HC RX IP 258 OP 636: Performed by: NURSE ANESTHETIST, CERTIFIED REGISTERED

## 2021-11-12 PROCEDURE — 370N000017 HC ANESTHESIA TECHNICAL FEE, PER MIN: Performed by: OTOLARYNGOLOGY

## 2021-11-12 PROCEDURE — 250N000009 HC RX 250: Performed by: NURSE ANESTHETIST, CERTIFIED REGISTERED

## 2021-11-12 PROCEDURE — 250N000011 HC RX IP 250 OP 636: Performed by: OTOLARYNGOLOGY

## 2021-11-12 PROCEDURE — C1778 LEAD, NEUROSTIMULATOR: HCPCS | Performed by: OTOLARYNGOLOGY

## 2021-11-12 PROCEDURE — 0466T PR INSRT CH WALL RESPIR ELTRD/RA & CONJ PULSE GEN: CPT | Performed by: OTOLARYNGOLOGY

## 2021-11-12 PROCEDURE — 272N000001 HC OR GENERAL SUPPLY STERILE: Performed by: OTOLARYNGOLOGY

## 2021-11-12 PROCEDURE — 250N000009 HC RX 250: Performed by: OTOLARYNGOLOGY

## 2021-11-12 PROCEDURE — 710N000010 HC RECOVERY PHASE 1, LEVEL 2, PER MIN: Performed by: OTOLARYNGOLOGY

## 2021-11-12 PROCEDURE — 999N000141 HC STATISTIC PRE-PROCEDURE NURSING ASSESSMENT: Performed by: OTOLARYNGOLOGY

## 2021-11-12 PROCEDURE — 360N000076 HC SURGERY LEVEL 3, PER MIN: Performed by: OTOLARYNGOLOGY

## 2021-11-12 PROCEDURE — 710N000012 HC RECOVERY PHASE 2, PER MINUTE: Performed by: OTOLARYNGOLOGY

## 2021-11-12 PROCEDURE — 250N000011 HC RX IP 250 OP 636: Performed by: NURSE ANESTHETIST, CERTIFIED REGISTERED

## 2021-11-12 PROCEDURE — 64568 OPN IMPLTJ CRNL NRV NEA&PG: CPT | Performed by: OTOLARYNGOLOGY

## 2021-11-12 PROCEDURE — 250N000013 HC RX MED GY IP 250 OP 250 PS 637: Performed by: OTOLARYNGOLOGY

## 2021-11-12 DEVICE — LEAD STIMULATION INSPIRE 4063-45: Type: IMPLANTABLE DEVICE | Site: NECK | Status: FUNCTIONAL

## 2021-11-12 DEVICE — IMPLANTABLE DEVICE: Type: IMPLANTABLE DEVICE | Site: CHEST | Status: FUNCTIONAL

## 2021-11-12 DEVICE — GENERATOR PULSE INSPIRE CPAP 3028: Type: IMPLANTABLE DEVICE | Site: CHEST | Status: FUNCTIONAL

## 2021-11-12 RX ORDER — LIDOCAINE HYDROCHLORIDE AND EPINEPHRINE 10; 10 MG/ML; UG/ML
INJECTION, SOLUTION INFILTRATION; PERINEURAL PRN
Status: DISCONTINUED | OUTPATIENT
Start: 2021-11-12 | End: 2021-11-12 | Stop reason: HOSPADM

## 2021-11-12 RX ORDER — CEFAZOLIN SODIUM 2 G/100ML
2 INJECTION, SOLUTION INTRAVENOUS
Status: COMPLETED | OUTPATIENT
Start: 2021-11-12 | End: 2021-11-12

## 2021-11-12 RX ORDER — HYDROXYZINE HYDROCHLORIDE 50 MG/1
50 TABLET, FILM COATED ORAL EVERY 6 HOURS PRN
Status: DISCONTINUED | OUTPATIENT
Start: 2021-11-12 | End: 2021-11-12 | Stop reason: HOSPADM

## 2021-11-12 RX ORDER — HYDROXYZINE HYDROCHLORIDE 25 MG/1
25 TABLET, FILM COATED ORAL EVERY 6 HOURS PRN
Status: DISCONTINUED | OUTPATIENT
Start: 2021-11-12 | End: 2021-11-12 | Stop reason: HOSPADM

## 2021-11-12 RX ORDER — ONDANSETRON 2 MG/ML
4 INJECTION INTRAMUSCULAR; INTRAVENOUS EVERY 30 MIN PRN
Status: DISCONTINUED | OUTPATIENT
Start: 2021-11-12 | End: 2021-11-12 | Stop reason: HOSPADM

## 2021-11-12 RX ORDER — SODIUM CHLORIDE, SODIUM LACTATE, POTASSIUM CHLORIDE, CALCIUM CHLORIDE 600; 310; 30; 20 MG/100ML; MG/100ML; MG/100ML; MG/100ML
INJECTION, SOLUTION INTRAVENOUS CONTINUOUS
Status: DISCONTINUED | OUTPATIENT
Start: 2021-11-12 | End: 2021-11-12 | Stop reason: HOSPADM

## 2021-11-12 RX ORDER — LIDOCAINE HYDROCHLORIDE 10 MG/ML
INJECTION, SOLUTION INFILTRATION; PERINEURAL PRN
Status: DISCONTINUED | OUTPATIENT
Start: 2021-11-12 | End: 2021-11-12

## 2021-11-12 RX ORDER — KETAMINE HYDROCHLORIDE 10 MG/ML
INJECTION, SOLUTION INTRAMUSCULAR; INTRAVENOUS PRN
Status: DISCONTINUED | OUTPATIENT
Start: 2021-11-12 | End: 2021-11-12

## 2021-11-12 RX ORDER — ACETAMINOPHEN 500 MG
1000 TABLET ORAL EVERY 6 HOURS
Qty: 200 TABLET | Refills: 1 | Status: SHIPPED | OUTPATIENT
Start: 2021-11-12 | End: 2021-11-22

## 2021-11-12 RX ORDER — FENTANYL CITRATE 50 UG/ML
INJECTION, SOLUTION INTRAMUSCULAR; INTRAVENOUS PRN
Status: DISCONTINUED | OUTPATIENT
Start: 2021-11-12 | End: 2021-11-12

## 2021-11-12 RX ORDER — NALOXONE HYDROCHLORIDE 0.4 MG/ML
0.2 INJECTION, SOLUTION INTRAMUSCULAR; INTRAVENOUS; SUBCUTANEOUS
Status: DISCONTINUED | OUTPATIENT
Start: 2021-11-12 | End: 2021-11-12 | Stop reason: HOSPADM

## 2021-11-12 RX ORDER — PROPOFOL 10 MG/ML
INJECTION, EMULSION INTRAVENOUS PRN
Status: DISCONTINUED | OUTPATIENT
Start: 2021-11-12 | End: 2021-11-12

## 2021-11-12 RX ORDER — ACETAMINOPHEN 325 MG/1
975 TABLET ORAL ONCE
Status: DISCONTINUED | OUTPATIENT
Start: 2021-11-12 | End: 2021-11-12 | Stop reason: HOSPADM

## 2021-11-12 RX ORDER — CEFAZOLIN SODIUM 2 G/100ML
2 INJECTION, SOLUTION INTRAVENOUS SEE ADMIN INSTRUCTIONS
Status: DISCONTINUED | OUTPATIENT
Start: 2021-11-12 | End: 2021-11-12 | Stop reason: HOSPADM

## 2021-11-12 RX ORDER — DEXAMETHASONE SODIUM PHOSPHATE 4 MG/ML
INJECTION, SOLUTION INTRA-ARTICULAR; INTRALESIONAL; INTRAMUSCULAR; INTRAVENOUS; SOFT TISSUE PRN
Status: DISCONTINUED | OUTPATIENT
Start: 2021-11-12 | End: 2021-11-12

## 2021-11-12 RX ORDER — PHENYLEPHRINE HYDROCHLORIDE 10 MG/ML
INJECTION INTRAVENOUS PRN
Status: DISCONTINUED | OUTPATIENT
Start: 2021-11-12 | End: 2021-11-12

## 2021-11-12 RX ORDER — ONDANSETRON 4 MG/1
4 TABLET, ORALLY DISINTEGRATING ORAL
Status: DISCONTINUED | OUTPATIENT
Start: 2021-11-12 | End: 2021-11-12 | Stop reason: HOSPADM

## 2021-11-12 RX ORDER — ONDANSETRON 2 MG/ML
INJECTION INTRAMUSCULAR; INTRAVENOUS PRN
Status: DISCONTINUED | OUTPATIENT
Start: 2021-11-12 | End: 2021-11-12

## 2021-11-12 RX ORDER — KETOROLAC TROMETHAMINE 30 MG/ML
INJECTION, SOLUTION INTRAMUSCULAR; INTRAVENOUS PRN
Status: DISCONTINUED | OUTPATIENT
Start: 2021-11-12 | End: 2021-11-12

## 2021-11-12 RX ORDER — NALOXONE HYDROCHLORIDE 0.4 MG/ML
0.4 INJECTION, SOLUTION INTRAMUSCULAR; INTRAVENOUS; SUBCUTANEOUS
Status: DISCONTINUED | OUTPATIENT
Start: 2021-11-12 | End: 2021-11-12 | Stop reason: HOSPADM

## 2021-11-12 RX ORDER — GLYCOPYRROLATE 0.2 MG/ML
INJECTION, SOLUTION INTRAMUSCULAR; INTRAVENOUS PRN
Status: DISCONTINUED | OUTPATIENT
Start: 2021-11-12 | End: 2021-11-12

## 2021-11-12 RX ORDER — FENTANYL CITRATE 50 UG/ML
50 INJECTION, SOLUTION INTRAMUSCULAR; INTRAVENOUS EVERY 5 MIN PRN
Status: DISCONTINUED | OUTPATIENT
Start: 2021-11-12 | End: 2021-11-12 | Stop reason: HOSPADM

## 2021-11-12 RX ORDER — ONDANSETRON 4 MG/1
4 TABLET, ORALLY DISINTEGRATING ORAL EVERY 30 MIN PRN
Status: DISCONTINUED | OUTPATIENT
Start: 2021-11-12 | End: 2021-11-12 | Stop reason: HOSPADM

## 2021-11-12 RX ORDER — FENTANYL CITRATE 50 UG/ML
50 INJECTION, SOLUTION INTRAMUSCULAR; INTRAVENOUS
Status: DISCONTINUED | OUTPATIENT
Start: 2021-11-12 | End: 2021-11-12 | Stop reason: HOSPADM

## 2021-11-12 RX ORDER — ACETAMINOPHEN 325 MG/1
975 TABLET ORAL ONCE
Status: COMPLETED | OUTPATIENT
Start: 2021-11-12 | End: 2021-11-12

## 2021-11-12 RX ORDER — SENNA AND DOCUSATE SODIUM 50; 8.6 MG/1; MG/1
1 TABLET, FILM COATED ORAL AT BEDTIME
Qty: 30 TABLET | Refills: 1 | Status: SHIPPED | OUTPATIENT
Start: 2021-11-12 | End: 2022-01-07

## 2021-11-12 RX ORDER — LIDOCAINE 40 MG/G
CREAM TOPICAL
Status: DISCONTINUED | OUTPATIENT
Start: 2021-11-12 | End: 2021-11-12 | Stop reason: HOSPADM

## 2021-11-12 RX ORDER — OXYCODONE HYDROCHLORIDE 5 MG/1
5 TABLET ORAL
Status: DISCONTINUED | OUTPATIENT
Start: 2021-11-12 | End: 2021-11-12 | Stop reason: HOSPADM

## 2021-11-12 RX ORDER — IBUPROFEN 400 MG/1
400 TABLET, FILM COATED ORAL
Status: DISCONTINUED | OUTPATIENT
Start: 2021-11-12 | End: 2021-11-12 | Stop reason: HOSPADM

## 2021-11-12 RX ORDER — HYDROMORPHONE HYDROCHLORIDE 1 MG/ML
0.5 INJECTION, SOLUTION INTRAMUSCULAR; INTRAVENOUS; SUBCUTANEOUS EVERY 5 MIN PRN
Status: DISCONTINUED | OUTPATIENT
Start: 2021-11-12 | End: 2021-11-12 | Stop reason: HOSPADM

## 2021-11-12 RX ORDER — MEPERIDINE HYDROCHLORIDE 25 MG/ML
12.5 INJECTION INTRAMUSCULAR; INTRAVENOUS; SUBCUTANEOUS
Status: DISCONTINUED | OUTPATIENT
Start: 2021-11-12 | End: 2021-11-12 | Stop reason: HOSPADM

## 2021-11-12 RX ORDER — OXYCODONE HYDROCHLORIDE 5 MG/1
5 TABLET ORAL EVERY 4 HOURS PRN
Qty: 15 TABLET | Refills: 0 | Status: SHIPPED | OUTPATIENT
Start: 2021-11-12 | End: 2021-11-22

## 2021-11-12 RX ADMIN — PHENYLEPHRINE HYDROCHLORIDE 100 MCG: 10 INJECTION INTRAVENOUS at 09:11

## 2021-11-12 RX ADMIN — MIDAZOLAM 2 MG: 1 INJECTION INTRAMUSCULAR; INTRAVENOUS at 07:41

## 2021-11-12 RX ADMIN — DEXAMETHASONE SODIUM PHOSPHATE 10 MG: 4 INJECTION, SOLUTION INTRA-ARTICULAR; INTRALESIONAL; INTRAMUSCULAR; INTRAVENOUS; SOFT TISSUE at 07:48

## 2021-11-12 RX ADMIN — FENTANYL CITRATE 100 MCG: 50 INJECTION, SOLUTION INTRAMUSCULAR; INTRAVENOUS at 07:48

## 2021-11-12 RX ADMIN — ONDANSETRON 4 MG: 2 INJECTION INTRAMUSCULAR; INTRAVENOUS at 09:31

## 2021-11-12 RX ADMIN — ROCURONIUM BROMIDE 30 MG: 50 INJECTION, SOLUTION INTRAVENOUS at 07:48

## 2021-11-12 RX ADMIN — PHENYLEPHRINE HYDROCHLORIDE 200 MCG: 10 INJECTION INTRAVENOUS at 08:13

## 2021-11-12 RX ADMIN — SODIUM CHLORIDE, POTASSIUM CHLORIDE, SODIUM LACTATE AND CALCIUM CHLORIDE: 600; 310; 30; 20 INJECTION, SOLUTION INTRAVENOUS at 09:50

## 2021-11-12 RX ADMIN — SUGAMMADEX 100 MG: 100 INJECTION, SOLUTION INTRAVENOUS at 09:42

## 2021-11-12 RX ADMIN — PHENYLEPHRINE HYDROCHLORIDE 100 MCG: 10 INJECTION INTRAVENOUS at 09:49

## 2021-11-12 RX ADMIN — LIDOCAINE HYDROCHLORIDE 0.1 ML: 10 INJECTION, SOLUTION EPIDURAL; INFILTRATION; INTRACAUDAL; PERINEURAL at 07:13

## 2021-11-12 RX ADMIN — KETAMINE HYDROCHLORIDE 10 MG: 10 INJECTION INTRAMUSCULAR; INTRAVENOUS at 09:13

## 2021-11-12 RX ADMIN — HYDROMORPHONE HYDROCHLORIDE 0.5 MG: 1 INJECTION, SOLUTION INTRAMUSCULAR; INTRAVENOUS; SUBCUTANEOUS at 09:13

## 2021-11-12 RX ADMIN — PHENYLEPHRINE HYDROCHLORIDE 200 MCG: 10 INJECTION INTRAVENOUS at 08:29

## 2021-11-12 RX ADMIN — HYDROMORPHONE HYDROCHLORIDE 0.5 MG: 1 INJECTION, SOLUTION INTRAMUSCULAR; INTRAVENOUS; SUBCUTANEOUS at 08:08

## 2021-11-12 RX ADMIN — KETAMINE HYDROCHLORIDE 20 MG: 10 INJECTION INTRAMUSCULAR; INTRAVENOUS at 07:48

## 2021-11-12 RX ADMIN — PHENYLEPHRINE HYDROCHLORIDE 100 MCG: 10 INJECTION INTRAVENOUS at 09:43

## 2021-11-12 RX ADMIN — GLYCOPYRROLATE 2 MG: 0.2 INJECTION, SOLUTION INTRAMUSCULAR; INTRAVENOUS at 07:48

## 2021-11-12 RX ADMIN — KETOROLAC TROMETHAMINE 15 MG: 30 INJECTION, SOLUTION INTRAMUSCULAR at 09:31

## 2021-11-12 RX ADMIN — PHENYLEPHRINE HYDROCHLORIDE 200 MCG: 10 INJECTION INTRAVENOUS at 08:01

## 2021-11-12 RX ADMIN — SODIUM CHLORIDE, POTASSIUM CHLORIDE, SODIUM LACTATE AND CALCIUM CHLORIDE: 600; 310; 30; 20 INJECTION, SOLUTION INTRAVENOUS at 07:13

## 2021-11-12 RX ADMIN — ACETAMINOPHEN 975 MG: 325 TABLET, FILM COATED ORAL at 06:54

## 2021-11-12 RX ADMIN — PHENYLEPHRINE HYDROCHLORIDE 0.5 MCG/KG/MIN: 10 INJECTION INTRAVENOUS at 08:34

## 2021-11-12 RX ADMIN — LIDOCAINE HYDROCHLORIDE 100 MG: 10 INJECTION, SOLUTION INFILTRATION; PERINEURAL at 07:48

## 2021-11-12 RX ADMIN — PHENYLEPHRINE HYDROCHLORIDE 100 MCG: 10 INJECTION INTRAVENOUS at 08:23

## 2021-11-12 RX ADMIN — PROPOFOL 200 MG: 10 INJECTION, EMULSION INTRAVENOUS at 07:48

## 2021-11-12 RX ADMIN — CEFAZOLIN SODIUM 2 G: 2 INJECTION, SOLUTION INTRAVENOUS at 08:00

## 2021-11-12 ASSESSMENT — MIFFLIN-ST. JEOR: SCORE: 1352.57

## 2021-11-12 NOTE — ANESTHESIA PROCEDURE NOTES
Airway       Patient location during procedure: OR       Procedure Start/Stop Times: 11/12/2021 7:50 AM  Staff -        CRNA: Yajaira Castillo APRN CRNA       Performed By: CRNA  Consent for Airway        Urgency: elective  Indications and Patient Condition       Indications for airway management: allyssa-procedural and airway protection       Induction type:intravenous       Mask difficulty assessment: 1 - vent by mask    Final Airway Details       Final airway type: endotracheal airway       Successful airway: ETT - single  Endotracheal Airway Details        ETT size (mm): 7.0       Successful intubation technique: video laryngoscopy       VL Blade Size: Lim 3       Grade View of Cords: 1       Adjucts: stylet       Position: Left       Measured from: gums/teeth       Secured at (cm): 21       Bite block used: Soft    Post intubation assessment        Placement verified by: capnometry, equal breath sounds and chest rise        Number of attempts at approach: 1       Number of other approaches attempted: 0       Secured with: silk tape       Ease of procedure: easy       Dentition: Intact and Unchanged

## 2021-11-12 NOTE — PROGRESS NOTES
Able to stick out tongue and move right to left. Right corner of lip is a little swollen, from electrode

## 2021-11-12 NOTE — OP NOTE
PREOPERATIVE DIAGNOSES: Moderate obstructive sleep apnea, intolerance to CPAP.    POSTOPERATIVE DIAGNOSES: Same.     PROCEDURE PERFORMED:   1. Placement of 12th cranial nerve (hypoglossal) stimulation implant  2. Placement of right pleural respiration sensor   3. Electronic analysis of the implanted neurostimulator pulse generator system  4. Hypoglossal nerve monitoring using NIM EMG  5. Microsurgical techniques using the operating microscope    SURGEON: Joe Ruggiero MD     ASSISTANTS: None.    BLOOD LOSS: 10 mL.    COMPLICATIONS: None.     SPECIMENS: None.     ANESTHESIA: General.    GRAFTS, IMPLANTS, DRAINS: Inspire Hypoglossal Nerve Stimulation Implant, Hypoglossal Nerve Stimulation Lead, Respiratory Sensor Lead    INDICATIONS: Improve obstructive apnea, treatment.    FINDINGS:   1. Normal branch pattern of the distal hypoglossal nerve.    OPERATIVE TECHNIQUE: The patient was brought to the operating room and identified by name clinic number.  They were placed supinely on the operating room table and general endotracheal anesthesia was induced by the anesthesia service.  The bed was rotated 180 degrees and the patient was prepped and draped in standard fashion.  Prior to prepping and draping, electrodes were placed in the genioglossus and styloglossus muscle for intraoperative nerve monitoring. The EMG hypoglossal nerve monitor was confirmed functional.      A modified sub-mandibular incision was made in the right upper neck approximately 2 cm below the mandible in the natural skin crease. Dissection was carried down through the subcutaneous tissue and platysma. The inferior border of the submandibular gland was identified as well as the digastric tendon. The submandibular gland and the overlying fascia with the marginal mandibular nerve were retracted superiorly. The digastric was retracted inferiorly. Dissection was carried down into the digastric triangle where the hypoglossal nerve was identified in its  usual fashion. The mylohyoid muscle was retracted anteriorally, and the hypoglossal nerve was dissected up towards the floor of the mouth.  The operating microscope was used for microsurgical dissection of the distal hypoglossal nerve branches.  The exclusion branches to retrusor muscles were identified, and tested intra-operatively using the EMG stimulator. The cuff electrode for the hypoglossal nerve stimulator was placed distally to the exclusion branches on the medial nerve branches to the genioglossus muscle. The C1 branch was included in the cuff.  The neck anchor was secured with 3-0 interrupted silk suture.    Attention was then turned to the chest. A 5 cm incision was made in the right upper chest approximately 3 cm below the clavicle and 3 cm lateral from the sternum.  Dissection was carried down to the pectoralis muscle. An inferior pocket was created deep to the subcutaneous layer and superficial to the pectoralis muscle. Dissection was carried down through the medial pectoralis muscle bluntly.  The subpectoral fat pad was identified and swept laterally. The external oblique muscle and fascia were identified inferior to the second rib space. We entered the plane between the external and internal intercostals bluntly. The pleural respiration sensor was placed into the pocket from medial to lateral.  The distal anchor was sutured medially to the external oblique facia using interrupted 3-0 silk sutures.  The pectoralis muscle was then returned to anatomic position and the proximal was secured to the pectoralis major muscle facia using interrupted 3-0 silk sutures.     The stimulation lead was then tunneled in a subplatysmal plane and brought out into the sub-clavicular pocket.  Both the cuff electrode and the respiration sensing lead were connected to the implantable pulse generator. Diagnostic evaluation was performed confirming good respiration sensing signal as well as good tongue protrusion  stimulation.     The implantable pulse generator was placed in the subclavicular pocket and secured loosely to the pectoralis fascia using 2-0 silk interrupted sutures. All the wounds were copiously irrigated with normal saline. Valsalva was performed and hemostasis was assured.  The incisions were then closed with deep interrupted 3-0 Vicryl, dermal 4-0 Monocryl sutures, and Dermabond for skin closure.    This marked the end of the procedure.  The patient was then turned over to anesthesia for recovery where they were awakened, extubated, and transferred to the PACU in excellent condition.  Neck and chest x-rays were performed in PACU to confirm placement and document the absence of a pneumothorax.  There were no complications.  There was minimal blood loss.  All standard operating room protocol and universal precautions were used throughout the procedure.      Joe Ruggiero MD  Department of Otolaryngology-Head and Neck Surgery  [facility]

## 2021-11-12 NOTE — PROGRESS NOTES
Chief Complaint   Patient presents with     Post-op Visit     inspire implant on 11/12/21- no concerns today     History of Present Illness  Evangelista Watkins is a 46 year old female who presents today for follow-up.  The patient went to the operating room on 11/12/2021 and underwent placement of the hypoglossal nerve stimulator.  The patient has done well postoperatively with minimal pain. The patient denies any problems with the incision.  The patient presents today for follow-up.     Past Medical History  Patient Active Problem List   Diagnosis     NEELAM (obstructive sleep apnea)     Diabetes mellitus, type 2 (H)     Current Medications    Current Outpatient Medications:      aspirin 81 MG EC tablet, Take 81 mg by mouth daily, Disp: , Rfl:      atorvastatin (LIPITOR) 80 MG tablet, Take 80 mg by mouth daily, Disp: , Rfl:      buPROPion (WELLBUTRIN SR) 150 MG 12 hr tablet, Take 150 mg by mouth daily, Disp: , Rfl:      DULoxetine (CYMBALTA) 60 MG capsule, Take 120 mg by mouth daily, Disp: , Rfl:      hydrOXYzine (VISTARIL) 50 MG capsule, Take 50 mg by mouth 4 times daily as needed, Disp: , Rfl:      insulin lispro prot & lispro (HUMALOG MIX 75/25 KWIKPEN) (75-25) 100 UNIT/ML pen, 20 units in the morning and 10 units in the evening, Disp: , Rfl:      melatonin 3 MG tablet, Take 1 mg by mouth nightly as needed for sleep, Disp: , Rfl:      oxyCODONE (ROXICODONE) 5 MG tablet, Take 1 tablet (5 mg) by mouth every 4 hours as needed for severe pain Pain not controlled with acetaminophen., Disp: 15 tablet, Rfl: 0     semaglutide (OZEMPIC, 1 MG/DOSE,) 2 MG/1.5ML pen, Inject 1 mg Subcutaneous every 7 days, Disp: , Rfl:      SENNA-docusate sodium (SENNA S) 8.6-50 MG tablet, Take 1 tablet by mouth At Bedtime Use while taking narcotic pain medications.  Hold for diarrhea., Disp: 30 tablet, Rfl: 1     acetaminophen (TYLENOL) 500 MG tablet, Take 2 tablets (1,000 mg) by mouth every 6 hours for 10 days Every 6 hours for post-op pain.   Alternate with ibuprofen., Disp: 200 tablet, Rfl: 1     blood glucose (FREESTYLE PRECISION LEONILA TEST) test strip, Use to test blood sugar as directed by Freestyle Norm, Disp: , Rfl:      Continuous Blood Gluc Sensor (FREESTYLE NORM 14 DAY SENSOR) MISC, Change every 14 days., Disp: , Rfl:     Allergies  Allergies   Allergen Reactions     Metformin Diarrhea     Other reaction(s): diarrhea  diarrhea          Social History  Social History     Socioeconomic History     Marital status:      Spouse name: Not on file     Number of children: Not on file     Years of education: Not on file     Highest education level: Not on file   Occupational History     Not on file   Tobacco Use     Smoking status: Former Smoker     Packs/day: 1.00     Types: Cigarettes     Quit date: 2020     Years since quittin.7     Smokeless tobacco: Never Used   Substance and Sexual Activity     Alcohol use: No     Comment: rarely     Drug use: No     Sexual activity: Yes     Partners: Male   Other Topics Concern     Parent/sibling w/ CABG, MI or angioplasty before 65F 55M? No   Social History Narrative     Not on file     Social Determinants of Health     Financial Resource Strain: Not on file   Food Insecurity: Not on file   Transportation Needs: Not on file   Physical Activity: Not on file   Stress: Not on file   Social Connections: Not on file   Intimate Partner Violence: Not on file   Housing Stability: Not on file       Family History  Family History   Adopted: Yes   Problem Relation Age of Onset     Depression Mother      Unknown/Adopted Maternal Grandmother      Unknown/Adopted Maternal Grandfather      Unknown/Adopted Paternal Grandmother      Unknown/Adopted Paternal Grandfather      Unknown/Adopted Brother      Depression Brother      Unknown/Adopted Sister      Depression Sister      Depression Daughter      Unknown/Adopted Other      Unknown/Adopted Other      Depression Other      Autism Spectrum Disorder Other       Cerebrovascular Disease Mother      Cancer Mother      Cerebrovascular Disease Father        Review of Systems  As per HPI and PMHx, otherwise 10 system review including the head and neck, constitutional, eyes, respiratory, GI, skin, neurologic, lymphatic, endocrine, and allergy systems is negative.    Physical Exam  /89 (BP Location: Right arm, Patient Position: Chair, Cuff Size: Adult Regular)   Pulse 98   Temp 97.6  F (36.4  C) (Tympanic)   Wt 74.8 kg (165 lb)   BMI 29.23 kg/m    GENERAL: Patient is a pleasant, cooperative 46 year old female in no acute distress.  HEAD: Normocephalic, atraumatic.  Hair and scalp are normal.  EYES: Pupils are equal, round, reactive to light and accommodation.  Extraocular movements are intact.  The sclera nonicteric without injection.  The extraocular structures are normal.  EARS: Normal shape and symmetry.  No tenderness when palpating the mastoid or tragal areas bilaterally.    NOSE: Nares are patent.  Nasal mucosa is pink and moist. Negative anterior rhinoscopy.  ORAL CAVITY: Dentition is in good repair.  Mucous membranes are moist.  Tongue is mobile, protrudes to the midline.  Palate elevates symmetrically.    NECK: Supple, trachea is midline.  The right submental incision is clean, dry, intact.  No erythema or fluctuance.  No evidence of hematoma or seroma.    CHEST: The right upper chest incision is clean, dry, intact.  Patient has a fairly significant amount of ecchymosis and a slight amount of induration around the device but no signs of erythema, fluctuance, or infection.  No signs of hematoma or seroma.    NEUROLOGIC: Cranial nerves II through XII are grossly intact.  Voice is strong.  Patient is House-Brackmann I/VI on the right and House-Brackmann I/VI on the left.  CARDIOVASCULAR: Extremities are warm and well-perfused.  No significant peripheral edema.  RESPIRATORY: Patient has nonlabored breathing without cough, wheeze, stridor.  PSYCHIATRIC: Patient is  alert and oriented.  Mood and affect appear normal.  SKIN: Warm and dry.  No scalp, face, or neck lesions noted.    Assessment and Plan     ICD-10-CM    1. Moderate obstructive sleep apnea  G47.33    2. Intolerance of continuous positive airway pressure (CPAP) ventilation  Z78.9    3. BMI 29.0-29.9,adult  Z68.29    4. Status post insertion of nerve stimulator  Z96.82    5. Postoperative state  Z98.890       It was my pleasure seeing Evangelista Watkins today in clinic.  The patient is healing well after placement of the hypoglossal nerve stimulator.  We discussed lifting and activity restrictions.  We discussed incision care and follow-up. I would like to see the patient back at the end of the month for recheck.  The patient will need to meet with sleep medicine for activation 1 month after placement of the device. The paient knows to contact me sooner with any problems or concerns.    Joe Ruggiero MD  Department of Otolaryngology-Head and Neck Surgery  [facility]

## 2021-11-12 NOTE — ANESTHESIA POSTPROCEDURE EVALUATION
Patient: Evangelista Watkins    Procedure: Procedure(s):  INSERTION, PULSE GENERATOR, NEUROSTIMULATOR       Diagnosis:Moderate obstructive sleep apnea [G47.33]  Intolerance of continuous positive airway pressure (CPAP) ventilation [Z78.9]  BMI 29.0-29.9,adult [Z68.29]  Diagnosis Additional Information: No value filed.    Anesthesia Type:  General    Note:  Disposition: Outpatient   Postop Pain Control: Uneventful            Sign Out: Well controlled pain   PONV: No   Neuro/Psych: Uneventful            Sign Out: Acceptable/Baseline neuro status   Airway/Respiratory: Uneventful            Sign Out: Acceptable/Baseline resp. status   CV/Hemodynamics: Uneventful            Sign Out: Acceptable CV status; No obvious hypovolemia; No obvious fluid overload   Other NRE: NONE   DID A NON-ROUTINE EVENT OCCUR? No           Last vitals:  Vitals Value Taken Time   /88 11/12/21 1045   Temp 37.1  C (98.8  F) 11/12/21 1045   Pulse 101 11/12/21 1045   Resp 8 11/12/21 1045   SpO2 98 % 11/12/21 1045       Electronically Signed By: OWEN Gordon CRNA  November 12, 2021  11:20 AM

## 2021-11-12 NOTE — ANESTHESIA CARE TRANSFER NOTE
Patient: Evangelista Watkins    Procedure: Procedure(s):  INSERTION, PULSE GENERATOR, NEUROSTIMULATOR       Diagnosis: Moderate obstructive sleep apnea [G47.33]  Intolerance of continuous positive airway pressure (CPAP) ventilation [Z78.9]  BMI 29.0-29.9,adult [Z68.29]  Diagnosis Additional Information: No value filed.    Anesthesia Type:   General     Note:    Oropharynx: oral airway in place  Level of Consciousness: drowsy  Oxygen Supplementation: face mask    Independent Airway: airway patency satisfactory and stable  Dentition: dentition unchanged  Vital Signs Stable: post-procedure vital signs reviewed and stable  Report to RN Given: handoff report given  Patient transferred to: PACU    Handoff Report: Identifed the Patient, Identified the Reponsible Provider, Reviewed the pertinent medical history, Discussed the surgical course, Reviewed Intra-OP anesthesia mangement and issues during anesthesia, Set expectations for post-procedure period and Allowed opportunity for questions and acknowledgement of understanding      Vitals:  Vitals Value Taken Time   /92 11/12/21 1009   Temp     Pulse 101 11/12/21 1013   Resp 12 11/12/21 1013   SpO2 100 % 11/12/21 1013   Vitals shown include unvalidated device data.    Electronically Signed By: OWEN Gordon CRNA  November 12, 2021  10:14 AM

## 2021-11-12 NOTE — DISCHARGE INSTRUCTIONS
Same Day Surgery Discharge Instructions  Special Precautions After Surgery - Adult    1. It is not unusual to feel lightheaded or faint, up to 24 hours after surgery or while taking pain medication.  If you have these symptoms; sit for a few minutes before standing and have someone assist you when getting up.  2. You should rest and relax for the next 24 hours and must have someone stay with you for at least 24 hours after your discharge.  3. DO NOT DRIVE any vehicle or operate mechanical equipment for 24 hours following the end of your surgery.  DO NOT DRIVE while taking narcotic pain medications that have been prescribed by your physician.  If you had a limb operated on, you must be able to use it fully to drive.  4. DO NOT drink alcoholic beverages for 24 hours following surgery or while taking prescription pain medication.  5. Drink clear liquids (apple juice, ginger ale, broth, 7-Up, etc.).  Progress to your regular diet as you feel able.  6. Any questions call your physician and do not make important decisions for 24 hours       INCISIONAL CARE  ? May shower starting tomorrow  ? May use ice for comfort  ? Be alert for signs of infection: fever, foul smelling or yellow/green drainage, then call dr bowen         Medications:  ? Tylenol next dose may be taken at 1:00 pm  ? Ibuprofen next dose may be taken at 3:30 pm  ? Oxycodone 5 mg 1 tablet every 4 hours     __________________________________________________________________________________________________________________________________  IMPORTANT NUMBERS:    [facility] [place] Number:  [phone], [phone]  Pharmacy:  [phone]  Same Day Surgery:  [phone], Monday - Friday until 8:30 p.m.    Emergency Room:  [phone]                                                                                     [facility]:  [phone] Dr. Bowen's office  Home Medical Equipment: [phone]  [facility]:   [phone]    Discharge Instructions for Inspire Implant    Recovery - Everyone recovers differently from a general anesthetic.  Symptoms such as fatigue, nausea, lightheadedness, and sometimes a low grade fever (up to 101 degrees) are not unusual.  As your body removes the anesthetic drugs from circulation, these symptoms will resolve.  The incisions will be sore after surgery, and a mild amount of swelling and redness is normal.  Use ice packs as needed.  There are no diet restrictions after placement of the Inspire implant, and you can resume your home medications, except blood thinners such as aspirin or coumadin, which should not be taken for 1-5 days after surgery. Clarify the length of time with your surgeon.  Limit your activity to no strenuous activities until I see you for the first follow up visit, and sleep with your head and neck elevated.  I recommend no heavy lifting greater than 15-20 pounds for the first 14 days following surgery.  You can shower and let the water run over the incision 48 hours after surgery. Do not scrub the incision, but pat it dry when you are finished. Also, do not submerge the incision in a tub until it is entirely healed (approximately 4 weeks following surgery).       Medications - You were sent home with narcotic pain medication.  If you can tolerate the discomfort during your recovery by using Tylenol or Ibuprofen (advil), please do so.  However, do not hesitate to use the stronger pain medication if needed.     Complications - Problems related to Inspire implant placement are usually either due to infection or bleeding.  Signs of infection such as redness, increasing pain, swelling, pus at the incision, and fever should be reported as soon as possible.  If there is a fast growing swelling of the surgical area and difficulty breathing or swallowing (like being choked), this is an emergency.  If it is slow but persistent, be taken to an emergency room.  If you feel like  your breathing is being blocked - Call 911.    Follow up - At your first follow-up, I will examine the incision sites to make sure there are no troubles with healing.  You will also have another appointment with sleep medicine 4 weeks after implant placement to activate the device.      If there are any questions or issues with the above, or if there are other issues that concern you, always feel free to call the clinic and I am happy to speak with you as soon as feasible.    Joe Ruggiero MD  Department of Otolaryngology-Head and Neck Surgery  [facility]  [phone] or [phone] After hours, [facility] option

## 2021-11-14 ENCOUNTER — HEALTH MAINTENANCE LETTER (OUTPATIENT)
Age: 46
End: 2021-11-14

## 2021-11-16 ENCOUNTER — TELEPHONE (OUTPATIENT)
Dept: SLEEP MEDICINE | Facility: CLINIC | Age: 46
End: 2021-11-16
Payer: COMMERCIAL

## 2021-11-16 NOTE — TELEPHONE ENCOUNTER
VM left requesting call back.  Patient needs to schedule activation appointment with Dr. Brewster or Dr. Salgado

## 2021-11-19 ENCOUNTER — OFFICE VISIT (OUTPATIENT)
Dept: OTOLARYNGOLOGY | Facility: CLINIC | Age: 46
End: 2021-11-19
Payer: COMMERCIAL

## 2021-11-19 VITALS
TEMPERATURE: 97.6 F | DIASTOLIC BLOOD PRESSURE: 89 MMHG | SYSTOLIC BLOOD PRESSURE: 132 MMHG | HEART RATE: 98 BPM | WEIGHT: 165 LBS | BODY MASS INDEX: 29.23 KG/M2

## 2021-11-19 DIAGNOSIS — Z98.890 POSTOPERATIVE STATE: ICD-10-CM

## 2021-11-19 DIAGNOSIS — Z96.82 STATUS POST INSERTION OF NERVE STIMULATOR: ICD-10-CM

## 2021-11-19 DIAGNOSIS — G47.33 MODERATE OBSTRUCTIVE SLEEP APNEA: Primary | ICD-10-CM

## 2021-11-19 DIAGNOSIS — Z78.9 INTOLERANCE OF CONTINUOUS POSITIVE AIRWAY PRESSURE (CPAP) VENTILATION: ICD-10-CM

## 2021-11-19 PROCEDURE — 99024 POSTOP FOLLOW-UP VISIT: CPT | Performed by: OTOLARYNGOLOGY

## 2021-11-19 ASSESSMENT — PAIN SCALES - GENERAL: PAINLEVEL: NO PAIN (0)

## 2021-11-19 NOTE — NURSING NOTE
"Initial /89 (BP Location: Right arm, Patient Position: Chair, Cuff Size: Adult Regular)   Pulse 98   Temp 97.6  F (36.4  C) (Tympanic)   Wt 74.8 kg (165 lb)   BMI 29.23 kg/m   Estimated body mass index is 29.23 kg/m  as calculated from the following:    Height as of 11/12/21: 1.6 m (5' 3\").    Weight as of this encounter: 74.8 kg (165 lb). .    Lara Alarcon LPN    "

## 2021-11-19 NOTE — LETTER
11/19/2021         RE: Evangelista Watkins  [address]  [facility] [address]        Dear Colleague,    Thank you for referring your patient, Evangelista Watkins, to the [facility]. Please see a copy of my visit note below.    Chief Complaint   Patient presents with     Post-op Visit     inspire implant on 11/12/21- no concerns today     History of Present Illness  Evangelista Watkins is a 46 year old female who presents today for follow-up.  The patient went to the operating room on 11/12/2021 and underwent placement of the hypoglossal nerve stimulator.  The patient has done well postoperatively with minimal pain. The patient denies any problems with the incision.  The patient presents today for follow-up.     Past Medical History  Patient Active Problem List   Diagnosis     NEELAM (obstructive sleep apnea)     Diabetes mellitus, type 2 (H)     Current Medications    Current Outpatient Medications:      aspirin 81 MG EC tablet, Take 81 mg by mouth daily, Disp: , Rfl:      atorvastatin (LIPITOR) 80 MG tablet, Take 80 mg by mouth daily, Disp: , Rfl:      buPROPion (WELLBUTRIN SR) 150 MG 12 hr tablet, Take 150 mg by mouth daily, Disp: , Rfl:      DULoxetine (CYMBALTA) 60 MG capsule, Take 120 mg by mouth daily, Disp: , Rfl:      hydrOXYzine (VISTARIL) 50 MG capsule, Take 50 mg by mouth 4 times daily as needed, Disp: , Rfl:      insulin lispro prot & lispro (HUMALOG MIX 75/25 KWIKPEN) (75-25) 100 UNIT/ML pen, 20 units in the morning and 10 units in the evening, Disp: , Rfl:      melatonin 3 MG tablet, Take 1 mg by mouth nightly as needed for sleep, Disp: , Rfl:      oxyCODONE (ROXICODONE) 5 MG tablet, Take 1 tablet (5 mg) by mouth every 4 hours as needed for severe pain Pain not controlled with acetaminophen., Disp: 15 tablet, Rfl: 0     semaglutide (OZEMPIC, 1 MG/DOSE,) 2 MG/1.5ML pen, Inject 1 mg Subcutaneous every 7 days, Disp: , Rfl:      SENNA-docusate sodium (SENNA S) 8.6-50 MG tablet, Take 1 tablet  by mouth At Bedtime Use while taking narcotic pain medications.  Hold for diarrhea., Disp: 30 tablet, Rfl: 1     acetaminophen (TYLENOL) 500 MG tablet, Take 2 tablets (1,000 mg) by mouth every 6 hours for 10 days Every 6 hours for post-op pain.  Alternate with ibuprofen., Disp: 200 tablet, Rfl: 1     blood glucose (FREESTYLE PRECISION LEONILA TEST) test strip, Use to test blood sugar as directed by Freestyle Norm, Disp: , Rfl:      Continuous Blood Gluc Sensor (FREESTYLE NORM 14 DAY SENSOR) MISC, Change every 14 days., Disp: , Rfl:     Allergies  Allergies   Allergen Reactions     Metformin Diarrhea     Other reaction(s): diarrhea  diarrhea          Social History  Social History     Socioeconomic History     Marital status:      Spouse name: Not on file     Number of children: Not on file     Years of education: Not on file     Highest education level: Not on file   Occupational History     Not on file   Tobacco Use     Smoking status: Former Smoker     Packs/day: 1.00     Types: Cigarettes     Quit date: 2020     Years since quittin.7     Smokeless tobacco: Never Used   Substance and Sexual Activity     Alcohol use: No     Comment: rarely     Drug use: No     Sexual activity: Yes     Partners: Male   Other Topics Concern     Parent/sibling w/ CABG, MI or angioplasty before 65F 55M? No   Social History Narrative     Not on file     Social Determinants of Health     Financial Resource Strain: Not on file   Food Insecurity: Not on file   Transportation Needs: Not on file   Physical Activity: Not on file   Stress: Not on file   Social Connections: Not on file   Intimate Partner Violence: Not on file   Housing Stability: Not on file       Family History  Family History   Adopted: Yes   Problem Relation Age of Onset     Depression Mother      Unknown/Adopted Maternal Grandmother      Unknown/Adopted Maternal Grandfather      Unknown/Adopted Paternal Grandmother      Unknown/Adopted Paternal Grandfather       Unknown/Adopted Brother      Depression Brother      Unknown/Adopted Sister      Depression Sister      Depression Daughter      Unknown/Adopted Other      Unknown/Adopted Other      Depression Other      Autism Spectrum Disorder Other      Cerebrovascular Disease Mother      Cancer Mother      Cerebrovascular Disease Father        Review of Systems  As per HPI and PMHx, otherwise 10 system review including the head and neck, constitutional, eyes, respiratory, GI, skin, neurologic, lymphatic, endocrine, and allergy systems is negative.    Physical Exam  /89 (BP Location: Right arm, Patient Position: Chair, Cuff Size: Adult Regular)   Pulse 98   Temp 97.6  F (36.4  C) (Tympanic)   Wt 74.8 kg (165 lb)   BMI 29.23 kg/m    GENERAL: Patient is a pleasant, cooperative 46 year old female in no acute distress.  HEAD: Normocephalic, atraumatic.  Hair and scalp are normal.  EYES: Pupils are equal, round, reactive to light and accommodation.  Extraocular movements are intact.  The sclera nonicteric without injection.  The extraocular structures are normal.  EARS: Normal shape and symmetry.  No tenderness when palpating the mastoid or tragal areas bilaterally.    NOSE: Nares are patent.  Nasal mucosa is pink and moist. Negative anterior rhinoscopy.  ORAL CAVITY: Dentition is in good repair.  Mucous membranes are moist.  Tongue is mobile, protrudes to the midline.  Palate elevates symmetrically.    NECK: Supple, trachea is midline.  The right submental incision is clean, dry, intact.  No erythema or fluctuance.  No evidence of hematoma or seroma.    CHEST: The right upper chest incision is clean, dry, intact.  Patient has a fairly significant amount of ecchymosis and a slight amount of induration around the device but no signs of erythema, fluctuance, or infection.  No signs of hematoma or seroma.    NEUROLOGIC: Cranial nerves II through XII are grossly intact.  Voice is strong.  Patient is House-Brackmann I/VI on the  right and House-Brackmann I/VI on the left.  CARDIOVASCULAR: Extremities are warm and well-perfused.  No significant peripheral edema.  RESPIRATORY: Patient has nonlabored breathing without cough, wheeze, stridor.  PSYCHIATRIC: Patient is alert and oriented.  Mood and affect appear normal.  SKIN: Warm and dry.  No scalp, face, or neck lesions noted.    Assessment and Plan     ICD-10-CM    1. Moderate obstructive sleep apnea  G47.33    2. Intolerance of continuous positive airway pressure (CPAP) ventilation  Z78.9    3. BMI 29.0-29.9,adult  Z68.29    4. Status post insertion of nerve stimulator  Z96.82    5. Postoperative state  Z98.890       It was my pleasure seeing Evangelista Watkins today in clinic.  The patient is healing well after placement of the hypoglossal nerve stimulator.  We discussed lifting and activity restrictions.  We discussed incision care and follow-up. I would like to see the patient back at the end of the month for recheck.  The patient will need to meet with sleep medicine for activation 1 month after placement of the device. The paient knows to contact me sooner with any problems or concerns.    Joe Ruggiero MD  Department of Otolaryngology-Head and Neck Surgery  [facility]       Again, thank you for allowing me to participate in the care of your patient.        Sincerely,        Joe Ruggiero MD

## 2021-11-24 ENCOUNTER — TELEPHONE (OUTPATIENT)
Dept: SLEEP MEDICINE | Facility: CLINIC | Age: 46
End: 2021-11-24
Payer: COMMERCIAL

## 2022-01-07 ENCOUNTER — OFFICE VISIT (OUTPATIENT)
Dept: OTOLARYNGOLOGY | Facility: CLINIC | Age: 47
End: 2022-01-07
Payer: COMMERCIAL

## 2022-01-07 VITALS
TEMPERATURE: 97.3 F | DIASTOLIC BLOOD PRESSURE: 86 MMHG | BODY MASS INDEX: 30.65 KG/M2 | HEART RATE: 72 BPM | SYSTOLIC BLOOD PRESSURE: 127 MMHG | WEIGHT: 173 LBS

## 2022-01-07 DIAGNOSIS — G47.33 MODERATE OBSTRUCTIVE SLEEP APNEA: Primary | ICD-10-CM

## 2022-01-07 DIAGNOSIS — Z96.82 STATUS POST INSERTION OF NERVE STIMULATOR: ICD-10-CM

## 2022-01-07 DIAGNOSIS — Z98.890 POSTOPERATIVE STATE: ICD-10-CM

## 2022-01-07 DIAGNOSIS — Z78.9 INTOLERANCE OF CONTINUOUS POSITIVE AIRWAY PRESSURE (CPAP) VENTILATION: ICD-10-CM

## 2022-01-07 PROCEDURE — 99024 POSTOP FOLLOW-UP VISIT: CPT | Performed by: OTOLARYNGOLOGY

## 2022-01-07 ASSESSMENT — PAIN SCALES - GENERAL: PAINLEVEL: NO PAIN (0)

## 2022-01-07 NOTE — NURSING NOTE
"Initial /86 (BP Location: Right arm, Patient Position: Chair, Cuff Size: Adult Regular)   Pulse 72   Temp 97.3  F (36.3  C) (Tympanic)   Wt 78.5 kg (173 lb)   BMI 30.65 kg/m   Estimated body mass index is 30.65 kg/m  as calculated from the following:    Height as of 11/12/21: 1.6 m (5' 3\").    Weight as of this encounter: 78.5 kg (173 lb). .      Lara Alarcon LPN    "

## 2022-01-07 NOTE — LETTER
1/7/2022         RE: Evangelista Watkins  [address]  [facility] [address]        Dear Colleague,    Thank you for referring your patient, Evangelista Watkins, to the [facility]. Please see a copy of my visit note below.    Chief Complaint   Patient presents with     Post-op Visit     Inspire implanted on 11/12/21- no concerns today still can't get it turned on      History of Present Illness  Eavngelista Watkins is a 46 year old female who presents today for follow-up.  The patient went to the operating room on 11/12/2021 and underwent placement of the hypoglossal nerve stimulator.  The patient was seen for follow up on 11/19/2021 for first post op follow up and she was doing well.  She returns today for 4 week follow up.      The patient is doing well overall.  The patient denies any problems with the incision.  The patient presents today for follow-up.     Past Medical History  Patient Active Problem List   Diagnosis     NEELAM (obstructive sleep apnea)     Diabetes mellitus, type 2 (H)     Current Medications    Current Outpatient Medications:      aspirin 81 MG EC tablet, Take 81 mg by mouth daily, Disp: , Rfl:      atorvastatin (LIPITOR) 80 MG tablet, Take 80 mg by mouth daily, Disp: , Rfl:      blood glucose (FREESTYLE PRECISION LEONILA TEST) test strip, Use to test blood sugar as directed by Freestyle Norm, Disp: , Rfl:      buPROPion (WELLBUTRIN SR) 150 MG 12 hr tablet, Take 150 mg by mouth daily, Disp: , Rfl:      Continuous Blood Gluc Sensor (FREESTYLE NORM 14 DAY SENSOR) MISC, Change every 14 days., Disp: , Rfl:      DULoxetine (CYMBALTA) 60 MG capsule, Take 120 mg by mouth daily, Disp: , Rfl:      hydrOXYzine (VISTARIL) 50 MG capsule, Take 50 mg by mouth 4 times daily as needed, Disp: , Rfl:      insulin lispro prot & lispro (HUMALOG MIX 75/25 KWIKPEN) (75-25) 100 UNIT/ML pen, 20 units in the morning and 10 units in the evening, Disp: , Rfl:      melatonin 3 MG tablet, Take 1 mg by mouth  nightly as needed for sleep, Disp: , Rfl:      semaglutide (OZEMPIC, 1 MG/DOSE,) 2 MG/1.5ML pen, Inject 1 mg Subcutaneous every 7 days, Disp: , Rfl:     Allergies  Allergies   Allergen Reactions     Metformin Diarrhea     Other reaction(s): diarrhea  diarrhea          Social History  Social History     Socioeconomic History     Marital status:      Spouse name: Not on file     Number of children: Not on file     Years of education: Not on file     Highest education level: Not on file   Occupational History     Not on file   Tobacco Use     Smoking status: Former Smoker     Packs/day: 1.00     Types: Cigarettes     Quit date: 2020     Years since quittin.9     Smokeless tobacco: Never Used   Substance and Sexual Activity     Alcohol use: No     Comment: rarely     Drug use: No     Sexual activity: Yes     Partners: Male   Other Topics Concern     Parent/sibling w/ CABG, MI or angioplasty before 65F 55M? No   Social History Narrative     Not on file     Social Determinants of Health     Financial Resource Strain: Not on file   Food Insecurity: Not on file   Transportation Needs: Not on file   Physical Activity: Not on file   Stress: Not on file   Social Connections: Not on file   Intimate Partner Violence: Not on file   Housing Stability: Not on file       Family History  Family History   Adopted: Yes   Problem Relation Age of Onset     Depression Mother      Cerebrovascular Disease Mother      Cancer Mother      Cerebrovascular Disease Father      Depression Sister      Unknown/Adopted Maternal Grandmother         unknown     Unknown/Adopted Maternal Grandfather      Unknown/Adopted Paternal Grandmother      Unknown/Adopted Paternal Grandfather      Depression Daughter        Review of Systems  As per HPI and PMHx, otherwise 10 system review including the head and neck, constitutional, eyes, respiratory, GI, skin, neurologic, lymphatic, endocrine, and allergy systems is negative.    Physical Exam  BP  127/86 (BP Location: Right arm, Patient Position: Chair, Cuff Size: Adult Regular)   Pulse 72   Temp 97.3  F (36.3  C) (Tympanic)   Wt 78.5 kg (173 lb)   BMI 30.65 kg/m    GENERAL: Patient is a pleasant, cooperative 46 year old female in no acute distress.  HEAD: Normocephalic, atraumatic.  Hair and scalp are normal.  EYES: Pupils are equal, round, reactive to light and accommodation.  Extraocular movements are intact.  The sclera nonicteric without injection.  The extraocular structures are normal.  EARS: Normal shape and symmetry.  No tenderness when palpating the mastoid or tragal areas bilaterally.    NECK: Supple, trachea is midline.  The right submental incision is clean, dry, intact.  No erythema or fluctuance.  No evidence of hematoma or seroma.    CHEST: The right upper chest incision is clean, dry, intact.  No evidence of hematoma or seroma.  No erythema or fluctuance.  NEUROLOGIC: Cranial nerves II through XII are grossly intact.  Voice is strong.  Patient is House-Brackmann I/VI on the right and House-Brackmann I/VI on the left.  CARDIOVASCULAR: Extremities are warm and well-perfused.  No significant peripheral edema.  RESPIRATORY: Patient has nonlabored breathing without cough, wheeze, stridor.  PSYCHIATRIC: Patient is alert and oriented.  Mood and affect appear normal.  SKIN: Warm and dry.  No scalp, face, or neck lesions noted.    Assessment and Plan     ICD-10-CM    1. Moderate obstructive sleep apnea  G47.33    2. Intolerance of continuous positive airway pressure (CPAP) ventilation  Z78.9    3. BMI 29.0-29.9,adult  Z68.29    4. Status post insertion of nerve stimulator  Z96.82    5. Postoperative state  Z98.890       It was my pleasure seeing Evangelista Watkins today in clinic.  The patient is healing well after placement of the hypoglossal nerve stimulator.  We discussed lifting and activity restrictions.  We discussed incision care and follow-up. The patient will need to meet with sleep medicine at  the end of the month for activation.  We discussed aftercare including using massage, trying silicone based scar cream, and keeping the incisions out of the sun for the first year after surgery.  The paient knows to contact me sooner with any problems or concerns.    Joe Ruggiero MD  Department of Otolaryngology-Head and Neck Surgery  [facility]         Again, thank you for allowing me to participate in the care of your patient.        Sincerely,        Joe Ruggiero MD

## 2022-01-07 NOTE — PROGRESS NOTES
Chief Complaint   Patient presents with     Post-op Visit     Inspire implanted on 11/12/21- no concerns today still can't get it turned on      History of Present Illness  Evangelista Watkins is a 46 year old female who presents today for follow-up.  The patient went to the operating room on 11/12/2021 and underwent placement of the hypoglossal nerve stimulator.  The patient was seen for follow up on 11/19/2021 for first post op follow up and she was doing well.  She returns today for 4 week follow up.      The patient is doing well overall.  The patient denies any problems with the incision.  The patient presents today for follow-up.     Past Medical History  Patient Active Problem List   Diagnosis     NEELAM (obstructive sleep apnea)     Diabetes mellitus, type 2 (H)     Current Medications    Current Outpatient Medications:      aspirin 81 MG EC tablet, Take 81 mg by mouth daily, Disp: , Rfl:      atorvastatin (LIPITOR) 80 MG tablet, Take 80 mg by mouth daily, Disp: , Rfl:      blood glucose (FREESTYLE PRECISION LEONILA TEST) test strip, Use to test blood sugar as directed by Freestyle Norm, Disp: , Rfl:      buPROPion (WELLBUTRIN SR) 150 MG 12 hr tablet, Take 150 mg by mouth daily, Disp: , Rfl:      Continuous Blood Gluc Sensor (FREESTYLE NORM 14 DAY SENSOR) MISC, Change every 14 days., Disp: , Rfl:      DULoxetine (CYMBALTA) 60 MG capsule, Take 120 mg by mouth daily, Disp: , Rfl:      hydrOXYzine (VISTARIL) 50 MG capsule, Take 50 mg by mouth 4 times daily as needed, Disp: , Rfl:      insulin lispro prot & lispro (HUMALOG MIX 75/25 KWIKPEN) (75-25) 100 UNIT/ML pen, 20 units in the morning and 10 units in the evening, Disp: , Rfl:      melatonin 3 MG tablet, Take 1 mg by mouth nightly as needed for sleep, Disp: , Rfl:      semaglutide (OZEMPIC, 1 MG/DOSE,) 2 MG/1.5ML pen, Inject 1 mg Subcutaneous every 7 days, Disp: , Rfl:     Allergies  Allergies   Allergen Reactions     Metformin Diarrhea     Other reaction(s):  diarrhea  diarrhea          Social History  Social History     Socioeconomic History     Marital status:      Spouse name: Not on file     Number of children: Not on file     Years of education: Not on file     Highest education level: Not on file   Occupational History     Not on file   Tobacco Use     Smoking status: Former Smoker     Packs/day: 1.00     Types: Cigarettes     Quit date: 2020     Years since quittin.9     Smokeless tobacco: Never Used   Substance and Sexual Activity     Alcohol use: No     Comment: rarely     Drug use: No     Sexual activity: Yes     Partners: Male   Other Topics Concern     Parent/sibling w/ CABG, MI or angioplasty before 65F 55M? No   Social History Narrative     Not on file     Social Determinants of Health     Financial Resource Strain: Not on file   Food Insecurity: Not on file   Transportation Needs: Not on file   Physical Activity: Not on file   Stress: Not on file   Social Connections: Not on file   Intimate Partner Violence: Not on file   Housing Stability: Not on file       Family History  Family History   Adopted: Yes   Problem Relation Age of Onset     Depression Mother      Cerebrovascular Disease Mother      Cancer Mother      Cerebrovascular Disease Father      Depression Sister      Unknown/Adopted Maternal Grandmother         unknown     Unknown/Adopted Maternal Grandfather      Unknown/Adopted Paternal Grandmother      Unknown/Adopted Paternal Grandfather      Depression Daughter        Review of Systems  As per HPI and PMHx, otherwise 10 system review including the head and neck, constitutional, eyes, respiratory, GI, skin, neurologic, lymphatic, endocrine, and allergy systems is negative.    Physical Exam  /86 (BP Location: Right arm, Patient Position: Chair, Cuff Size: Adult Regular)   Pulse 72   Temp 97.3  F (36.3  C) (Tympanic)   Wt 78.5 kg (173 lb)   BMI 30.65 kg/m    GENERAL: Patient is a pleasant, cooperative 46 year old female in  no acute distress.  HEAD: Normocephalic, atraumatic.  Hair and scalp are normal.  EYES: Pupils are equal, round, reactive to light and accommodation.  Extraocular movements are intact.  The sclera nonicteric without injection.  The extraocular structures are normal.  EARS: Normal shape and symmetry.  No tenderness when palpating the mastoid or tragal areas bilaterally.    NECK: Supple, trachea is midline.  The right submental incision is clean, dry, intact.  No erythema or fluctuance.  No evidence of hematoma or seroma.    CHEST: The right upper chest incision is clean, dry, intact.  No evidence of hematoma or seroma.  No erythema or fluctuance.  NEUROLOGIC: Cranial nerves II through XII are grossly intact.  Voice is strong.  Patient is House-Brackmann I/VI on the right and House-Brackmann I/VI on the left.  CARDIOVASCULAR: Extremities are warm and well-perfused.  No significant peripheral edema.  RESPIRATORY: Patient has nonlabored breathing without cough, wheeze, stridor.  PSYCHIATRIC: Patient is alert and oriented.  Mood and affect appear normal.  SKIN: Warm and dry.  No scalp, face, or neck lesions noted.    Assessment and Plan     ICD-10-CM    1. Moderate obstructive sleep apnea  G47.33    2. Intolerance of continuous positive airway pressure (CPAP) ventilation  Z78.9    3. BMI 29.0-29.9,adult  Z68.29    4. Status post insertion of nerve stimulator  Z96.82    5. Postoperative state  Z98.890       It was my pleasure seeing Evangelista Watkins today in clinic.  The patient is healing well after placement of the hypoglossal nerve stimulator.  We discussed lifting and activity restrictions.  We discussed incision care and follow-up. The patient will need to meet with sleep medicine at the end of the month for activation.  We discussed aftercare including using massage, trying silicone based scar cream, and keeping the incisions out of the sun for the first year after surgery.  The paient knows to contact me sooner with  any problems or concerns.    Joe Ruggiero MD  Department of Otolaryngology-Head and Neck Surgery  [facility]

## 2022-01-24 ENCOUNTER — TELEPHONE (OUTPATIENT)
Dept: SLEEP MEDICINE | Facility: CLINIC | Age: 47
End: 2022-01-24
Payer: COMMERCIAL

## 2022-01-24 NOTE — TELEPHONE ENCOUNTER
Patient scheduled for in person activation for Friday 1/28/2022 provider will not be available. Called patient to reschedule. Left message with new date and time and asked for a call back to confirm.     Samira Padilla CMA on 1/24/2022 at 3:19 PM

## 2022-03-06 ENCOUNTER — HEALTH MAINTENANCE LETTER (OUTPATIENT)
Age: 47
End: 2022-03-06

## 2022-06-26 ENCOUNTER — HEALTH MAINTENANCE LETTER (OUTPATIENT)
Age: 47
End: 2022-06-26

## 2022-11-20 ENCOUNTER — HEALTH MAINTENANCE LETTER (OUTPATIENT)
Age: 47
End: 2022-11-20

## 2022-12-30 ENCOUNTER — LAB REQUISITION (OUTPATIENT)
Dept: LAB | Facility: CLINIC | Age: 47
End: 2022-12-30

## 2022-12-30 DIAGNOSIS — E11.65 TYPE 2 DIABETES MELLITUS WITH HYPERGLYCEMIA (H): ICD-10-CM

## 2022-12-30 DIAGNOSIS — E78.2 MIXED HYPERLIPIDEMIA: ICD-10-CM

## 2022-12-30 LAB
ANION GAP SERPL CALCULATED.3IONS-SCNC: 14 MMOL/L (ref 7–15)
BUN SERPL-MCNC: 16.1 MG/DL (ref 6–20)
CALCIUM SERPL-MCNC: 10 MG/DL (ref 8.6–10)
CHLORIDE SERPL-SCNC: 98 MMOL/L (ref 98–107)
CHOLEST SERPL-MCNC: 199 MG/DL
CREAT SERPL-MCNC: 0.75 MG/DL (ref 0.51–0.95)
DEPRECATED HCO3 PLAS-SCNC: 26 MMOL/L (ref 22–29)
GFR SERPL CREATININE-BSD FRML MDRD: >90 ML/MIN/1.73M2
GLUCOSE SERPL-MCNC: 179 MG/DL (ref 70–99)
HDLC SERPL-MCNC: 78 MG/DL
LDLC SERPL CALC-MCNC: 80 MG/DL
NONHDLC SERPL-MCNC: 121 MG/DL
POTASSIUM SERPL-SCNC: 3.9 MMOL/L (ref 3.4–5.3)
SODIUM SERPL-SCNC: 138 MMOL/L (ref 136–145)
TRIGL SERPL-MCNC: 207 MG/DL

## 2022-12-30 PROCEDURE — 80048 BASIC METABOLIC PNL TOTAL CA: CPT | Performed by: PHYSICIAN ASSISTANT

## 2022-12-30 PROCEDURE — 80061 LIPID PANEL: CPT | Performed by: PHYSICIAN ASSISTANT

## 2023-01-05 ENCOUNTER — LAB REQUISITION (OUTPATIENT)
Dept: LAB | Facility: CLINIC | Age: 48
End: 2023-01-05
Payer: COMMERCIAL

## 2023-01-05 DIAGNOSIS — R05.1 ACUTE COUGH: ICD-10-CM

## 2023-01-05 PROCEDURE — U0003 INFECTIOUS AGENT DETECTION BY NUCLEIC ACID (DNA OR RNA); SEVERE ACUTE RESPIRATORY SYNDROME CORONAVIRUS 2 (SARS-COV-2) (CORONAVIRUS DISEASE [COVID-19]), AMPLIFIED PROBE TECHNIQUE, MAKING USE OF HIGH THROUGHPUT TECHNOLOGIES AS DESCRIBED BY CMS-2020-01-R: HCPCS | Mod: ORL | Performed by: STUDENT IN AN ORGANIZED HEALTH CARE EDUCATION/TRAINING PROGRAM

## 2023-01-06 LAB — SARS-COV-2 RNA RESP QL NAA+PROBE: NEGATIVE

## 2023-04-15 ENCOUNTER — HEALTH MAINTENANCE LETTER (OUTPATIENT)
Age: 48
End: 2023-04-15

## 2023-11-25 ENCOUNTER — HEALTH MAINTENANCE LETTER (OUTPATIENT)
Age: 48
End: 2023-11-25

## 2024-06-22 ENCOUNTER — HEALTH MAINTENANCE LETTER (OUTPATIENT)
Age: 49
End: 2024-06-22

## 2025-01-04 ENCOUNTER — HEALTH MAINTENANCE LETTER (OUTPATIENT)
Age: 50
End: 2025-01-04

## (undated) DEVICE — LINEN TOWEL PACK X5 5464

## (undated) DEVICE — SOL NACL 0.9% IRRIG 1000ML BOTTLE 2F7124

## (undated) DEVICE — JELLY LUBRICATING SURGILUBE 2OZ TUBE

## (undated) DEVICE — SU SILK 3-0 SH 30" K832H

## (undated) DEVICE — GOWN XLG DISP 9545

## (undated) DEVICE — GLOVE PROTEXIS W/NEU-THERA 7.5  2D73TE75

## (undated) DEVICE — IOM CASE FLAT FEE

## (undated) DEVICE — SOL WATER IRRIG 1000ML BOTTLE 2F7114

## (undated) DEVICE — NDL 25GA 1.5" 305127

## (undated) DEVICE — SYR 10ML FINGER CONTROL W/O NDL 309695

## (undated) DEVICE — COVER NEOPROBE SOFTFLEX 5X96" W/BANDS 20-PC596

## (undated) DEVICE — IOM STANDARD SUPPLY FEE

## (undated) DEVICE — ESU CORD BIPOLAR GREEN 10-4000

## (undated) DEVICE — TUNNELING TOOL AND OBTURATOR

## (undated) DEVICE — VESSEL LOOPS BLUE MAXI

## (undated) DEVICE — DRAPE U SPLIT 74X120" 29440

## (undated) DEVICE — TUBING SUCTION 12"X1/4" N612

## (undated) DEVICE — TONGUE DEPRESSOR STERILE 25-705-ALL

## (undated) DEVICE — CONTROL APPLIANCE SLEEP REMOTE INSPIRE 2580

## (undated) DEVICE — ESU ELEC BLADE 2.75" COATED/INSULATED E1455

## (undated) DEVICE — LABEL MEDICATION SYSTEM 3303-P

## (undated) DEVICE — SU VICRYL 3-0 SH 27" UND J416H

## (undated) DEVICE — DECANTER VIAL 2006S

## (undated) DEVICE — VESSEL LOOPS RED MINI 31145710

## (undated) DEVICE — DRAPE STERI TOWEL SM 1000

## (undated) DEVICE — NDL ANGIOCATH 20GA 1.25" 4056

## (undated) DEVICE — SU SILK 2-0 SH 30" K833H

## (undated) DEVICE — SU MONOCRYL 4-0 PS-2 18" UND Y496G

## (undated) DEVICE — BLADE KNIFE SURG 15 371115

## (undated) DEVICE — SPONGE COTTONOID 1/2X2" 20-32S

## (undated) DEVICE — SPONGE KITTNER 31001010

## (undated) DEVICE — DRAPE MICRO ZEISS MD 48"X120CM

## (undated) DEVICE — PREP SKIN SCRUB TRAY 4461A

## (undated) DEVICE — PACK LAPAROTOMY CUSTOM LAKES

## (undated) DEVICE — ADH SKIN CLOSURE PREMIERPRO EXOFIN 1.0ML 3470

## (undated) DEVICE — DRAPE IOBAN LG .375X23.5" 6648EZ

## (undated) DEVICE — ANTIFOG SOLUTION W/FOAM PAD 31142527

## (undated) RX ORDER — DEXAMETHASONE SODIUM PHOSPHATE 10 MG/ML
INJECTION, SOLUTION INTRAMUSCULAR; INTRAVENOUS
Status: DISPENSED
Start: 2021-11-12

## (undated) RX ORDER — DEXAMETHASONE SODIUM PHOSPHATE 4 MG/ML
INJECTION, SOLUTION INTRA-ARTICULAR; INTRALESIONAL; INTRAMUSCULAR; INTRAVENOUS; SOFT TISSUE
Status: DISPENSED
Start: 2021-03-24

## (undated) RX ORDER — KETOROLAC TROMETHAMINE 30 MG/ML
INJECTION, SOLUTION INTRAMUSCULAR; INTRAVENOUS
Status: DISPENSED
Start: 2021-03-24

## (undated) RX ORDER — FENTANYL CITRATE 50 UG/ML
INJECTION, SOLUTION INTRAMUSCULAR; INTRAVENOUS
Status: DISPENSED
Start: 2021-11-12

## (undated) RX ORDER — OXYMETAZOLINE HYDROCHLORIDE 0.05 G/100ML
SPRAY NASAL
Status: DISPENSED
Start: 2021-03-24

## (undated) RX ORDER — CEFAZOLIN SODIUM 2 G/100ML
INJECTION, SOLUTION INTRAVENOUS
Status: DISPENSED
Start: 2021-11-12

## (undated) RX ORDER — LIDOCAINE HYDROCHLORIDE AND EPINEPHRINE 10; 10 MG/ML; UG/ML
INJECTION, SOLUTION INFILTRATION; PERINEURAL
Status: DISPENSED
Start: 2021-11-12

## (undated) RX ORDER — DEXMEDETOMIDINE HYDROCHLORIDE 100 UG/ML
INJECTION, SOLUTION INTRAVENOUS
Status: DISPENSED
Start: 2021-11-12

## (undated) RX ORDER — PROPOFOL 10 MG/ML
INJECTION, EMULSION INTRAVENOUS
Status: DISPENSED
Start: 2021-11-12

## (undated) RX ORDER — ONDANSETRON 2 MG/ML
INJECTION INTRAMUSCULAR; INTRAVENOUS
Status: DISPENSED
Start: 2021-03-24

## (undated) RX ORDER — ONDANSETRON 2 MG/ML
INJECTION INTRAMUSCULAR; INTRAVENOUS
Status: DISPENSED
Start: 2021-11-12

## (undated) RX ORDER — LIDOCAINE HYDROCHLORIDE 20 MG/ML
INJECTION, SOLUTION EPIDURAL; INFILTRATION; INTRACAUDAL; PERINEURAL
Status: DISPENSED
Start: 2021-03-24

## (undated) RX ORDER — FENTANYL CITRATE-0.9 % NACL/PF 10 MCG/ML
PLASTIC BAG, INJECTION (ML) INTRAVENOUS
Status: DISPENSED
Start: 2021-11-12

## (undated) RX ORDER — KETOROLAC TROMETHAMINE 30 MG/ML
INJECTION, SOLUTION INTRAMUSCULAR; INTRAVENOUS
Status: DISPENSED
Start: 2021-11-12

## (undated) RX ORDER — ACETAMINOPHEN 325 MG/1
TABLET ORAL
Status: DISPENSED
Start: 2021-03-24

## (undated) RX ORDER — GLYCOPYRROLATE 0.2 MG/ML
INJECTION, SOLUTION INTRAMUSCULAR; INTRAVENOUS
Status: DISPENSED
Start: 2021-11-12

## (undated) RX ORDER — LIDOCAINE HYDROCHLORIDE 10 MG/ML
INJECTION, SOLUTION EPIDURAL; INFILTRATION; INTRACAUDAL; PERINEURAL
Status: DISPENSED
Start: 2021-11-12

## (undated) RX ORDER — LIDOCAINE HYDROCHLORIDE 40 MG/ML
SOLUTION TOPICAL
Status: DISPENSED
Start: 2021-03-24

## (undated) RX ORDER — GLYCOPYRROLATE 0.2 MG/ML
INJECTION, SOLUTION INTRAMUSCULAR; INTRAVENOUS
Status: DISPENSED
Start: 2021-03-24

## (undated) RX ORDER — PHENYLEPHRINE HYDROCHLORIDE 10 MG/ML
INJECTION INTRAVENOUS
Status: DISPENSED
Start: 2021-11-12

## (undated) RX ORDER — PROPOFOL 10 MG/ML
INJECTION, EMULSION INTRAVENOUS
Status: DISPENSED
Start: 2021-03-24

## (undated) RX ORDER — ACETAMINOPHEN 325 MG/1
TABLET ORAL
Status: DISPENSED
Start: 2021-11-12